# Patient Record
Sex: MALE | Race: WHITE | URBAN - NONMETROPOLITAN AREA
[De-identification: names, ages, dates, MRNs, and addresses within clinical notes are randomized per-mention and may not be internally consistent; named-entity substitution may affect disease eponyms.]

---

## 2024-08-29 ENCOUNTER — HOSPITAL ENCOUNTER (EMERGENCY)
Facility: HOSPITAL | Age: 64
End: 2024-08-29

## 2025-06-20 ENCOUNTER — HOSPITAL ENCOUNTER (INPATIENT)
Facility: HOSPITAL | Age: 65
End: 2025-06-20
Attending: DENTIST | Admitting: DENTIST
Payer: COMMERCIAL

## 2025-06-20 ENCOUNTER — APPOINTMENT (OUTPATIENT)
Dept: RADIOLOGY | Facility: HOSPITAL | Age: 65
DRG: 603 | End: 2025-06-20
Payer: COMMERCIAL

## 2025-06-20 ENCOUNTER — PREP FOR PROCEDURE (OUTPATIENT)
Facility: HOSPITAL | Age: 65
End: 2025-06-20

## 2025-06-20 ENCOUNTER — HOSPITAL ENCOUNTER (INPATIENT)
Facility: HOSPITAL | Age: 65
End: 2025-06-20
Attending: EMERGENCY MEDICINE | Admitting: INTERNAL MEDICINE
Payer: COMMERCIAL

## 2025-06-20 DIAGNOSIS — L02.11 CELLULITIS AND ABSCESS OF NECK: ICD-10-CM

## 2025-06-20 DIAGNOSIS — L02.11 CELLULITIS AND ABSCESS OF NECK: Primary | ICD-10-CM

## 2025-06-20 DIAGNOSIS — L02.01 SUBMENTAL ABSCESS: Primary | ICD-10-CM

## 2025-06-20 DIAGNOSIS — L03.221 CELLULITIS AND ABSCESS OF NECK: ICD-10-CM

## 2025-06-20 DIAGNOSIS — L03.221 CELLULITIS AND ABSCESS OF NECK: Primary | ICD-10-CM

## 2025-06-20 DIAGNOSIS — E11.65 TYPE 2 DIABETES MELLITUS WITH HYPERGLYCEMIA, WITHOUT LONG-TERM CURRENT USE OF INSULIN: ICD-10-CM

## 2025-06-20 PROBLEM — D72.829 LEUKOCYTOSIS: Status: ACTIVE | Noted: 2025-06-20

## 2025-06-20 PROBLEM — I10 HYPERTENSION: Status: ACTIVE | Noted: 2025-06-20

## 2025-06-20 PROBLEM — R73.9 HYPERGLYCEMIA: Status: ACTIVE | Noted: 2025-06-20

## 2025-06-20 LAB
ALBUMIN SERPL BCP-MCNC: 3.8 G/DL (ref 3.4–5)
ALP SERPL-CCNC: 76 U/L (ref 33–136)
ALT SERPL W P-5'-P-CCNC: 12 U/L (ref 10–52)
ANION GAP SERPL CALCULATED.3IONS-SCNC: 12 MMOL/L (ref 10–20)
AST SERPL W P-5'-P-CCNC: 9 U/L (ref 9–39)
BASOPHILS # BLD AUTO: 0.04 X10*3/UL (ref 0–0.1)
BASOPHILS NFR BLD AUTO: 0.3 %
BILIRUB SERPL-MCNC: 0.8 MG/DL (ref 0–1.2)
BUN SERPL-MCNC: 11 MG/DL (ref 6–23)
CALCIUM SERPL-MCNC: 9.2 MG/DL (ref 8.6–10.3)
CHLORIDE SERPL-SCNC: 95 MMOL/L (ref 98–107)
CO2 SERPL-SCNC: 30 MMOL/L (ref 21–32)
CREAT SERPL-MCNC: 0.73 MG/DL (ref 0.5–1.3)
EGFRCR SERPLBLD CKD-EPI 2021: >90 ML/MIN/1.73M*2
EOSINOPHIL # BLD AUTO: 0.08 X10*3/UL (ref 0–0.7)
EOSINOPHIL NFR BLD AUTO: 0.7 %
ERYTHROCYTE [DISTWIDTH] IN BLOOD BY AUTOMATED COUNT: 12.7 % (ref 11.5–14.5)
GLUCOSE SERPL-MCNC: 213 MG/DL (ref 74–99)
HCT VFR BLD AUTO: 42.7 % (ref 41–52)
HGB BLD-MCNC: 13.9 G/DL (ref 13.5–17.5)
IMM GRANULOCYTES # BLD AUTO: 0.11 X10*3/UL (ref 0–0.7)
IMM GRANULOCYTES NFR BLD AUTO: 0.9 % (ref 0–0.9)
LYMPHOCYTES # BLD AUTO: 1.68 X10*3/UL (ref 1.2–4.8)
LYMPHOCYTES NFR BLD AUTO: 14.4 %
MCH RBC QN AUTO: 27.3 PG (ref 26–34)
MCHC RBC AUTO-ENTMCNC: 32.6 G/DL (ref 32–36)
MCV RBC AUTO: 84 FL (ref 80–100)
MONOCYTES # BLD AUTO: 0.9 X10*3/UL (ref 0.1–1)
MONOCYTES NFR BLD AUTO: 7.7 %
NEUTROPHILS # BLD AUTO: 8.86 X10*3/UL (ref 1.2–7.7)
NEUTROPHILS NFR BLD AUTO: 76 %
NRBC BLD-RTO: 0 /100 WBCS (ref 0–0)
PLATELET # BLD AUTO: 305 X10*3/UL (ref 150–450)
POTASSIUM SERPL-SCNC: 3.9 MMOL/L (ref 3.5–5.3)
PROT SERPL-MCNC: 6.9 G/DL (ref 6.4–8.2)
RBC # BLD AUTO: 5.1 X10*6/UL (ref 4.5–5.9)
SODIUM SERPL-SCNC: 133 MMOL/L (ref 136–145)
WBC # BLD AUTO: 11.7 X10*3/UL (ref 4.4–11.3)

## 2025-06-20 PROCEDURE — 36415 COLL VENOUS BLD VENIPUNCTURE: CPT | Performed by: INTERNAL MEDICINE

## 2025-06-20 PROCEDURE — 87040 BLOOD CULTURE FOR BACTERIA: CPT | Mod: WESLAB | Performed by: INTERNAL MEDICINE

## 2025-06-20 PROCEDURE — 36415 COLL VENOUS BLD VENIPUNCTURE: CPT | Performed by: EMERGENCY MEDICINE

## 2025-06-20 PROCEDURE — 1210000001 HC SEMI-PRIVATE ROOM DAILY

## 2025-06-20 PROCEDURE — 99222 1ST HOSP IP/OBS MODERATE 55: CPT | Performed by: INTERNAL MEDICINE

## 2025-06-20 PROCEDURE — 85025 COMPLETE CBC W/AUTO DIFF WBC: CPT | Performed by: EMERGENCY MEDICINE

## 2025-06-20 PROCEDURE — 70492 CT SFT TSUE NCK W/O & W/DYE: CPT | Performed by: RADIOLOGY

## 2025-06-20 PROCEDURE — 2500000004 HC RX 250 GENERAL PHARMACY W/ HCPCS (ALT 636 FOR OP/ED): Performed by: EMERGENCY MEDICINE

## 2025-06-20 PROCEDURE — 99285 EMERGENCY DEPT VISIT HI MDM: CPT | Performed by: EMERGENCY MEDICINE

## 2025-06-20 PROCEDURE — 2500000004 HC RX 250 GENERAL PHARMACY W/ HCPCS (ALT 636 FOR OP/ED): Performed by: INTERNAL MEDICINE

## 2025-06-20 PROCEDURE — 2500000004 HC RX 250 GENERAL PHARMACY W/ HCPCS (ALT 636 FOR OP/ED): Mod: JZ | Performed by: DENTIST

## 2025-06-20 PROCEDURE — 70491 CT SOFT TISSUE NECK W/DYE: CPT

## 2025-06-20 PROCEDURE — 96366 THER/PROPH/DIAG IV INF ADDON: CPT

## 2025-06-20 PROCEDURE — 96365 THER/PROPH/DIAG IV INF INIT: CPT

## 2025-06-20 PROCEDURE — 70490 CT SOFT TISSUE NECK W/O DYE: CPT

## 2025-06-20 PROCEDURE — 2550000001 HC RX 255 CONTRASTS: Performed by: EMERGENCY MEDICINE

## 2025-06-20 PROCEDURE — 80053 COMPREHEN METABOLIC PANEL: CPT | Performed by: EMERGENCY MEDICINE

## 2025-06-20 RX ORDER — HYDROMORPHONE HYDROCHLORIDE 0.2 MG/ML
0.2 INJECTION INTRAMUSCULAR; INTRAVENOUS; SUBCUTANEOUS EVERY 4 HOURS PRN
Status: CANCELLED | OUTPATIENT
Start: 2025-06-20

## 2025-06-20 RX ORDER — ONDANSETRON HYDROCHLORIDE 2 MG/ML
4 INJECTION, SOLUTION INTRAVENOUS EVERY 8 HOURS PRN
Status: ACTIVE | OUTPATIENT
Start: 2025-06-20

## 2025-06-20 RX ORDER — ONDANSETRON 4 MG/1
4 TABLET, FILM COATED ORAL EVERY 8 HOURS PRN
Status: ACTIVE | OUTPATIENT
Start: 2025-06-20

## 2025-06-20 RX ORDER — IPRATROPIUM BROMIDE AND ALBUTEROL SULFATE 2.5; .5 MG/3ML; MG/3ML
3 SOLUTION RESPIRATORY (INHALATION) EVERY 6 HOURS PRN
Status: ACTIVE | OUTPATIENT
Start: 2025-06-20

## 2025-06-20 RX ORDER — ENOXAPARIN SODIUM 100 MG/ML
40 INJECTION SUBCUTANEOUS DAILY
Status: DISPENSED | OUTPATIENT
Start: 2025-06-21

## 2025-06-20 RX ORDER — SODIUM CHLORIDE 9 MG/ML
100 INJECTION, SOLUTION INTRAVENOUS CONTINUOUS
Status: DISCONTINUED | OUTPATIENT
Start: 2025-06-20 | End: 2025-06-20

## 2025-06-20 RX ORDER — AMOXICILLIN AND CLAVULANATE POTASSIUM 875; 125 MG/1; MG/1
875 TABLET, FILM COATED ORAL 2 TIMES DAILY
Status: ON HOLD | COMMUNITY

## 2025-06-20 RX ORDER — HYDRALAZINE HYDROCHLORIDE 20 MG/ML
5 INJECTION INTRAMUSCULAR; INTRAVENOUS EVERY 8 HOURS PRN
Status: ACTIVE | OUTPATIENT
Start: 2025-06-20

## 2025-06-20 RX ORDER — DEXTROSE 50 % IN WATER (D50W) INTRAVENOUS SYRINGE
12.5
Status: ACTIVE | OUTPATIENT
Start: 2025-06-20

## 2025-06-20 RX ORDER — SODIUM CHLORIDE 9 MG/ML
75 INJECTION, SOLUTION INTRAVENOUS CONTINUOUS
Status: CANCELLED | OUTPATIENT
Start: 2025-06-20 | End: 2025-06-21

## 2025-06-20 RX ORDER — DEXTROSE 50 % IN WATER (D50W) INTRAVENOUS SYRINGE
25
Status: ACTIVE | OUTPATIENT
Start: 2025-06-20

## 2025-06-20 RX ORDER — LISINOPRIL 20 MG/1
20 TABLET ORAL DAILY
Status: ON HOLD | COMMUNITY

## 2025-06-20 RX ORDER — MORPHINE SULFATE 2 MG/ML
2 INJECTION, SOLUTION INTRAMUSCULAR; INTRAVENOUS EVERY 2 HOUR PRN
Status: CANCELLED | OUTPATIENT
Start: 2025-06-20

## 2025-06-20 RX ORDER — PANTOPRAZOLE SODIUM 40 MG/10ML
40 INJECTION, POWDER, LYOPHILIZED, FOR SOLUTION INTRAVENOUS DAILY
Status: DISPENSED | OUTPATIENT
Start: 2025-06-20

## 2025-06-20 RX ORDER — MORPHINE SULFATE 2 MG/ML
2 INJECTION, SOLUTION INTRAMUSCULAR; INTRAVENOUS EVERY 2 HOUR PRN
Status: DISPENSED | OUTPATIENT
Start: 2025-06-20

## 2025-06-20 RX ORDER — NAPROXEN SODIUM 220 MG
440 TABLET ORAL EVERY 12 HOURS PRN
Status: ON HOLD | COMMUNITY

## 2025-06-20 RX ORDER — SODIUM CHLORIDE 9 MG/ML
75 INJECTION, SOLUTION INTRAVENOUS CONTINUOUS
Status: ACTIVE | OUTPATIENT
Start: 2025-06-20 | End: 2025-06-21

## 2025-06-20 RX ADMIN — AMPICILLIN SODIUM AND SULBACTAM SODIUM 3 G: 2; 1 INJECTION, POWDER, FOR SOLUTION INTRAMUSCULAR; INTRAVENOUS at 22:31

## 2025-06-20 RX ADMIN — PANTOPRAZOLE SODIUM 40 MG: 40 INJECTION, POWDER, LYOPHILIZED, FOR SOLUTION INTRAVENOUS at 22:43

## 2025-06-20 RX ADMIN — HYDROMORPHONE HYDROCHLORIDE 0.5 MG: 1 INJECTION, SOLUTION INTRAMUSCULAR; INTRAVENOUS; SUBCUTANEOUS at 22:04

## 2025-06-20 RX ADMIN — SODIUM CHLORIDE 75 ML/HR: 900 INJECTION, SOLUTION INTRAVENOUS at 16:11

## 2025-06-20 RX ADMIN — AMPICILLIN SODIUM AND SULBACTAM SODIUM 3 G: 2; 1 INJECTION, POWDER, FOR SOLUTION INTRAMUSCULAR; INTRAVENOUS at 16:11

## 2025-06-20 RX ADMIN — IOHEXOL 75 ML: 350 INJECTION, SOLUTION INTRAVENOUS at 18:23

## 2025-06-20 SDOH — HEALTH STABILITY: MENTAL HEALTH
DO YOU FEEL STRESS - TENSE, RESTLESS, NERVOUS, OR ANXIOUS, OR UNABLE TO SLEEP AT NIGHT BECAUSE YOUR MIND IS TROUBLED ALL THE TIME - THESE DAYS?: NOT AT ALL

## 2025-06-20 SDOH — ECONOMIC STABILITY: HOUSING INSECURITY: AT ANY TIME IN THE PAST 12 MONTHS, WERE YOU HOMELESS OR LIVING IN A SHELTER (INCLUDING NOW)?: NO

## 2025-06-20 SDOH — HEALTH STABILITY: MENTAL HEALTH: HOW OFTEN DO YOU HAVE A DRINK CONTAINING ALCOHOL?: MONTHLY OR LESS

## 2025-06-20 SDOH — SOCIAL STABILITY: SOCIAL INSECURITY: DOES ANYONE TRY TO KEEP YOU FROM HAVING/CONTACTING OTHER FRIENDS OR DOING THINGS OUTSIDE YOUR HOME?: NO

## 2025-06-20 SDOH — SOCIAL STABILITY: SOCIAL INSECURITY
WITHIN THE LAST YEAR, HAVE YOU BEEN KICKED, HIT, SLAPPED, OR OTHERWISE PHYSICALLY HURT BY YOUR PARTNER OR EX-PARTNER?: NO

## 2025-06-20 SDOH — HEALTH STABILITY: MENTAL HEALTH: HOW MANY DRINKS CONTAINING ALCOHOL DO YOU HAVE ON A TYPICAL DAY WHEN YOU ARE DRINKING?: 1 OR 2

## 2025-06-20 SDOH — HEALTH STABILITY: PHYSICAL HEALTH
HOW OFTEN DO YOU NEED TO HAVE SOMEONE HELP YOU WHEN YOU READ INSTRUCTIONS, PAMPHLETS, OR OTHER WRITTEN MATERIAL FROM YOUR DOCTOR OR PHARMACY?: RARELY

## 2025-06-20 SDOH — SOCIAL STABILITY: SOCIAL INSECURITY
WITHIN THE LAST YEAR, HAVE YOU BEEN RAPED OR FORCED TO HAVE ANY KIND OF SEXUAL ACTIVITY BY YOUR PARTNER OR EX-PARTNER?: NO

## 2025-06-20 SDOH — SOCIAL STABILITY: SOCIAL INSECURITY: HAVE YOU HAD ANY THOUGHTS OF HARMING ANYONE ELSE?: NO

## 2025-06-20 SDOH — SOCIAL STABILITY: SOCIAL INSECURITY: ARE YOU OR HAVE YOU BEEN THREATENED OR ABUSED PHYSICALLY, EMOTIONALLY, OR SEXUALLY BY ANYONE?: NO

## 2025-06-20 SDOH — ECONOMIC STABILITY: HOUSING INSECURITY: IN THE LAST 12 MONTHS, WAS THERE A TIME WHEN YOU WERE NOT ABLE TO PAY THE MORTGAGE OR RENT ON TIME?: NO

## 2025-06-20 SDOH — SOCIAL STABILITY: SOCIAL INSECURITY: WITHIN THE LAST YEAR, HAVE YOU BEEN HUMILIATED OR EMOTIONALLY ABUSED IN OTHER WAYS BY YOUR PARTNER OR EX-PARTNER?: NO

## 2025-06-20 SDOH — HEALTH STABILITY: PHYSICAL HEALTH: ON AVERAGE, HOW MANY MINUTES DO YOU ENGAGE IN EXERCISE AT THIS LEVEL?: 30 MIN

## 2025-06-20 SDOH — ECONOMIC STABILITY: FOOD INSECURITY: WITHIN THE PAST 12 MONTHS, YOU WORRIED THAT YOUR FOOD WOULD RUN OUT BEFORE YOU GOT THE MONEY TO BUY MORE.: NEVER TRUE

## 2025-06-20 SDOH — ECONOMIC STABILITY: INCOME INSECURITY: IN THE PAST 12 MONTHS HAS THE ELECTRIC, GAS, OIL, OR WATER COMPANY THREATENED TO SHUT OFF SERVICES IN YOUR HOME?: NO

## 2025-06-20 SDOH — SOCIAL STABILITY: SOCIAL NETWORK: HOW OFTEN DO YOU ATTEND CHURCH OR RELIGIOUS SERVICES?: MORE THAN 4 TIMES PER YEAR

## 2025-06-20 SDOH — HEALTH STABILITY: PHYSICAL HEALTH: ON AVERAGE, HOW MANY DAYS PER WEEK DO YOU ENGAGE IN MODERATE TO STRENUOUS EXERCISE (LIKE A BRISK WALK)?: 2 DAYS

## 2025-06-20 SDOH — HEALTH STABILITY: MENTAL HEALTH: HOW OFTEN DO YOU HAVE SIX OR MORE DRINKS ON ONE OCCASION?: NEVER

## 2025-06-20 SDOH — SOCIAL STABILITY: SOCIAL NETWORK
DO YOU BELONG TO ANY CLUBS OR ORGANIZATIONS SUCH AS CHURCH GROUPS, UNIONS, FRATERNAL OR ATHLETIC GROUPS, OR SCHOOL GROUPS?: NO

## 2025-06-20 SDOH — SOCIAL STABILITY: SOCIAL NETWORK: HOW OFTEN DO YOU ATTEND MEETINGS OF THE CLUBS OR ORGANIZATIONS YOU BELONG TO?: NEVER

## 2025-06-20 SDOH — SOCIAL STABILITY: SOCIAL INSECURITY: HAVE YOU HAD THOUGHTS OF HARMING ANYONE ELSE?: NO

## 2025-06-20 SDOH — SOCIAL STABILITY: SOCIAL INSECURITY: ARE YOU MARRIED, WIDOWED, DIVORCED, SEPARATED, NEVER MARRIED, OR LIVING WITH A PARTNER?: DIVORCED

## 2025-06-20 SDOH — ECONOMIC STABILITY: FOOD INSECURITY: WITHIN THE PAST 12 MONTHS, THE FOOD YOU BOUGHT JUST DIDN'T LAST AND YOU DIDN'T HAVE MONEY TO GET MORE.: NEVER TRUE

## 2025-06-20 SDOH — SOCIAL STABILITY: SOCIAL INSECURITY: WITHIN THE LAST YEAR, HAVE YOU BEEN AFRAID OF YOUR PARTNER OR EX-PARTNER?: NO

## 2025-06-20 SDOH — ECONOMIC STABILITY: TRANSPORTATION INSECURITY: IN THE PAST 12 MONTHS, HAS LACK OF TRANSPORTATION KEPT YOU FROM MEDICAL APPOINTMENTS OR FROM GETTING MEDICATIONS?: NO

## 2025-06-20 SDOH — SOCIAL STABILITY: SOCIAL NETWORK: HOW OFTEN DO YOU GET TOGETHER WITH FRIENDS OR RELATIVES?: MORE THAN THREE TIMES A WEEK

## 2025-06-20 SDOH — SOCIAL STABILITY: SOCIAL NETWORK
IN A TYPICAL WEEK, HOW MANY TIMES DO YOU TALK ON THE PHONE WITH FAMILY, FRIENDS, OR NEIGHBORS?: MORE THAN THREE TIMES A WEEK

## 2025-06-20 SDOH — ECONOMIC STABILITY: FOOD INSECURITY: HOW HARD IS IT FOR YOU TO PAY FOR THE VERY BASICS LIKE FOOD, HOUSING, MEDICAL CARE, AND HEATING?: NOT HARD AT ALL

## 2025-06-20 SDOH — ECONOMIC STABILITY: HOUSING INSECURITY: IN THE PAST 12 MONTHS, HOW MANY TIMES HAVE YOU MOVED WHERE YOU WERE LIVING?: 1

## 2025-06-20 SDOH — SOCIAL STABILITY: SOCIAL INSECURITY: ABUSE: ADULT

## 2025-06-20 SDOH — SOCIAL STABILITY: SOCIAL INSECURITY: HAS ANYONE EVER THREATENED TO HURT YOUR FAMILY OR YOUR PETS?: NO

## 2025-06-20 SDOH — SOCIAL STABILITY: SOCIAL INSECURITY: ARE THERE ANY APPARENT SIGNS OF INJURIES/BEHAVIORS THAT COULD BE RELATED TO ABUSE/NEGLECT?: NO

## 2025-06-20 SDOH — SOCIAL STABILITY: SOCIAL INSECURITY: WERE YOU ABLE TO COMPLETE ALL THE BEHAVIORAL HEALTH SCREENINGS?: YES

## 2025-06-20 SDOH — HEALTH STABILITY: MENTAL HEALTH: EXPERIENCED ANY OF THE FOLLOWING LIFE EVENTS: OTHER (COMMENT)

## 2025-06-20 SDOH — SOCIAL STABILITY: SOCIAL INSECURITY: DO YOU FEEL ANYONE HAS EXPLOITED OR TAKEN ADVANTAGE OF YOU FINANCIALLY OR OF YOUR PERSONAL PROPERTY?: NO

## 2025-06-20 SDOH — SOCIAL STABILITY: SOCIAL INSECURITY: POSSIBLE ABUSE REPORTED TO:: OTHER (COMMENT)

## 2025-06-20 SDOH — SOCIAL STABILITY: SOCIAL INSECURITY: DO YOU FEEL UNSAFE GOING BACK TO THE PLACE WHERE YOU ARE LIVING?: NO

## 2025-06-20 ASSESSMENT — COGNITIVE AND FUNCTIONAL STATUS - GENERAL
DAILY ACTIVITIY SCORE: 24
PATIENT BASELINE BEDBOUND: NO
MOBILITY SCORE: 24

## 2025-06-20 ASSESSMENT — ACTIVITIES OF DAILY LIVING (ADL)
HEARING - RIGHT EAR: FUNCTIONAL
TOILETING: INDEPENDENT
GROOMING: INDEPENDENT
JUDGMENT_ADEQUATE_SAFELY_COMPLETE_DAILY_ACTIVITIES: YES
LACK_OF_TRANSPORTATION: NO
DRESSING YOURSELF: INDEPENDENT
BATHING: INDEPENDENT
FEEDING YOURSELF: INDEPENDENT
HEARING - LEFT EAR: FUNCTIONAL
WALKS IN HOME: INDEPENDENT
ADEQUATE_TO_COMPLETE_ADL: YES
PATIENT'S MEMORY ADEQUATE TO SAFELY COMPLETE DAILY ACTIVITIES?: YES

## 2025-06-20 ASSESSMENT — LIFESTYLE VARIABLES
HOW OFTEN DO YOU HAVE 6 OR MORE DRINKS ON ONE OCCASION: NEVER
HOW MANY STANDARD DRINKS CONTAINING ALCOHOL DO YOU HAVE ON A TYPICAL DAY: 1 OR 2
AUDIT-C TOTAL SCORE: 1
AUDIT-C TOTAL SCORE: 1
PRESCIPTION_ABUSE_PAST_12_MONTHS: NO
SUBSTANCE_ABUSE_PAST_12_MONTHS: NO
SKIP TO QUESTIONS 9-10: 1
AUDIT-C TOTAL SCORE: 1
HOW OFTEN DO YOU HAVE A DRINK CONTAINING ALCOHOL: MONTHLY OR LESS
SKIP TO QUESTIONS 9-10: 1

## 2025-06-20 ASSESSMENT — PAIN - FUNCTIONAL ASSESSMENT
PAIN_FUNCTIONAL_ASSESSMENT: 0-10

## 2025-06-20 ASSESSMENT — PAIN DESCRIPTION - DESCRIPTORS: DESCRIPTORS: BURNING

## 2025-06-20 ASSESSMENT — PAIN SCALES - GENERAL
PAINLEVEL_OUTOF10: 1
PAINLEVEL_OUTOF10: 7
PAINLEVEL_OUTOF10: 8

## 2025-06-20 ASSESSMENT — PATIENT HEALTH QUESTIONNAIRE - PHQ9
SUM OF ALL RESPONSES TO PHQ9 QUESTIONS 1 & 2: 0
1. LITTLE INTEREST OR PLEASURE IN DOING THINGS: NOT AT ALL
2. FEELING DOWN, DEPRESSED OR HOPELESS: NOT AT ALL

## 2025-06-20 ASSESSMENT — PAIN DESCRIPTION - LOCATION
LOCATION: NECK
LOCATION: JAW

## 2025-06-20 ASSESSMENT — PAIN DESCRIPTION - ORIENTATION: ORIENTATION: LOWER;MID

## 2025-06-20 NOTE — ED PROVIDER NOTES
HPI   Chief Complaint   Patient presents with    neck swelling     Pt arrived from home with neck swelling. Pt states he had a tooth infection x 2 weeks, Pt having difficulty swallowing salivia.        HPI  This is a 64-year-old male presenting to the emergency department for evaluation of neck swelling.  [place] [facility] Kwong who plans to do incision and drainage tomorrow.  Was post to be a direct event however a bed is not available for him at this time and in the lobby patient was reportedly having difficulty swallowing so he was brought back.  Upon initial presentation, patient is not having any difficulty in the room providing a history, no difficulty talking.  He states the pain in his neck has been ongoing for last 2 weeks.  He has been on antibiotics prescribed by his dentist.  He has had subjective fever and chills.    Please see HPI for pertinent positive and negative ROS.       Patient History   Medical History[1]  Surgical History[2]  Family History[3]  Social History[4]    Physical Exam   ED Triage Vitals [06/20/25 1554]   Temperature Heart Rate Respirations BP   36.5 °C (97.7 °F) 95 16 154/85      Pulse Ox Temp src Heart Rate Source Patient Position   100 % -- -- Sitting      BP Location FiO2 (%)     Left arm --       Physical Exam  GENERAL APPEARANCE: Awake and alert. No acute respiratory distress.  Patient is talking in smooth nondyspneic sentences.  No stridor.  He is handling his oral secretions.  VITAL SIGNS: As per the nurses' triage record.  HEENT: Normocephalic, atraumatic. Extraocular muscles are intact. Conjunctiva are pink. Negative scleral icterus. Mucous membranes are moist. Tongue in the midline. Oropharynx clear, uvula midline.  TMs intact without erythema, no mastoid bone erythema or edema bilaterally.  NECK: Soft, and supple, full gross ROM, no meningeal signs.Induration and swelling to the submandibular region, area is indurated no fluctuance to  palpation.  CHEST: Nontender to palpation. Clear to auscultation bilaterally.  Symmetric rise and fall of chest wall.   HEART: Clear S1 and S2. Regular rate and rhythm. Strong and equal pulses in the extremities.  ABDOMEN: Soft, nondistended  MUSCULOSKELETAL: Full gross active range of motion. Ambulating on own with no acute difficulties  NEUROLOGICAL: Awake, alert and oriented x 3. Motor power intact in the upper and lower extremities. Sensation is intact to light touch in the upper and lower extremities. Patient answering questions appropriately.   IMMUNOLOGICAL: No lymphatic streaking noted  DERMATOLOGIC: Warm and dry   PYSCH: Cooperative with appropriate mood and affect.    ED Course & MDM   Diagnoses as of 06/20/25 2134   Submental abscess                 No data recorded     Manning Coma Scale Score: 15 (06/20/25 1600 : Soraida Brandon RN)                           Medical Decision Making  Parts of this chart have been completed using voice recognition software. Please excuse any errors of transcription.  My thought process and reason for plan has been formulated from the time that I saw the patient until the time of disposition and is not specific to one specific moment during their visit and furthermore my MDM encompasses this entire chart and not only this text box.      HPI: Detailed above.    Exam: A medically appropriate exam performed, outlined above, given the known history and presentation.    History obtained from: Patient    Medications given during visit:  Medications   sodium chloride 0.9% infusion (75 mL/hr intravenous New Bag 6/20/25 1611)   ampicillin-sulbactam (Unasyn) 3 g in sodium chloride 0.9%  mL (has no administration in time range)   HYDROmorphone (Dilaudid) injection 0.5 mg (has no administration in time range)   HYDROmorphone (Dilaudid) injection 0.2 mg (has no administration in time range)   morphine injection 2 mg (has no administration in time range)   pantoprazole (Protonix)  injection 40 mg (has no administration in time range)   glucagon (Glucagen) injection 1 mg (has no administration in time range)   dextrose 50 % injection 25 g (has no administration in time range)   glucagon (Glucagen) injection 1 mg (has no administration in time range)   dextrose 50 % injection 12.5 g (has no administration in time range)   hydrALAZINE (Apresoline) injection 5 mg (has no administration in time range)   ipratropium-albuteroL (Duo-Neb) 0.5-2.5 mg/3 mL nebulizer solution 3 mL (has no administration in time range)   enoxaparin (Lovenox) syringe 40 mg (has no administration in time range)   ondansetron (Zofran) tablet 4 mg (has no administration in time range)     Or   ondansetron (Zofran) injection 4 mg (has no administration in time range)   ampicillin-sulbactam (Unasyn) 3 g in sodium chloride 0.9%  mL (0 g intravenous Stopped 6/20/25 1908)   iohexol (OMNIPaque) 350 mg iodine/mL solution 75 mL (75 mL intravenous Given 6/20/25 1823)        Diagnostic/tests  Labs Reviewed   CBC WITH AUTO DIFFERENTIAL - Abnormal       Result Value    WBC 11.7 (*)     nRBC 0.0      RBC 5.10      Hemoglobin 13.9      Hematocrit 42.7      MCV 84      MCH 27.3      MCHC 32.6      RDW 12.7      Platelets 305      Neutrophils % 76.0      Immature Granulocytes %, Automated 0.9      Lymphocytes % 14.4      Monocytes % 7.7      Eosinophils % 0.7      Basophils % 0.3      Neutrophils Absolute 8.86 (*)     Immature Granulocytes Absolute, Automated 0.11      Lymphocytes Absolute 1.68      Monocytes Absolute 0.90      Eosinophils Absolute 0.08      Basophils Absolute 0.04     COMPREHENSIVE METABOLIC PANEL - Abnormal    Glucose 213 (*)     Sodium 133 (*)     Potassium 3.9      Chloride 95 (*)     Bicarbonate 30      Anion Gap 12      Urea Nitrogen 11      Creatinine 0.73      eGFR >90      Calcium 9.2      Albumin 3.8      Alkaline Phosphatase 76      Total Protein 6.9      AST 9      Bilirubin, Total 0.8      ALT 12     BLOOD  CULTURE   CBC WITH AUTO DIFFERENTIAL   COMPREHENSIVE METABOLIC PANEL   PROTIME-INR   TYPE AND SCREEN      CT soft tissue neck w IV contrast   Final Result   Large submental abscess. This measures up to 4.6 cm. There appears to   be a phlegmonous tract tracking along the right mylohyoid muscle and   extending from the right mandibular molar extraction cavity to the   aforementioned collection.        MACRO:   None        Signed by: Lisandro Leon 6/20/2025 6:57 PM   Dictation workstation:   [id number]      CT soft tissue neck wo IV contrast   Final Result   Ill-defined soft tissue density material in the submental region,   suspected infectious process. Difficult to determine if abscess is   present without IV contrast.        Extraction cavity right mandibular 2nd molar. There is erosion of the   subjacent inferomedial mandibular cortex. There is suspected tracking   of inflammation along the right mylohyoid muscle into the submental   region and communication with the aforementioned infectious process.        Otherwise multifocal endodontal and periodontal disease as described.        MACRO:   None        Signed by: Lisandro Leon 6/20/2025 4:56 PM   Dictation workstation:   [id number]           Considerations/further MDM:  Patient was seen in conjucntion with my supervising physician,  Dr. Del Valle. Please refer to her note.    CT soft tissue neck with IV contrast shows large submental abscess.  CBC shows mild leukocytosis with a white blood cell count of 11.7.  CMP unremarkable.  Patient was given IV Unasyn and IV fluids.  Admitted in stable condition to accepting physician, Dr. Wood.  Patient was agreeable to plan.    Procedure  Procedures       [1] No past medical history on file.  [2] No past surgical history on file.  [3] No family history on file.  [4]   Social History  Tobacco Use    Smoking status: Not on file    Smokeless tobacco: Not on file   Substance Use Topics    Alcohol use: Not on file    Drug  use: Not on file        Melanie Orona PA-C  06/20/25 3450

## 2025-06-20 NOTE — PROGRESS NOTES
Pharmacy Medication History Review    Adam Contreras is a 64 y.o. male. Pharmacy reviewed the patient's [id number] medications and allergies for accuracy.    Medications ADDED:  Lisinopril 20mg  Augmentin 875-125mf  Aleve 220  Medications CHANGED:  none  Medications REMOVED:   None      The list below reflects the updated PTA list. Comments regarding how patient may be taking medications differently can be found in the Admit Orders Activity  Prior to Admission Medications   Prescriptions Last Dose Informant   amoxicillin-clavulanate (Augmentin) 875-125 mg tablet 6/20/2025 Self   Sig: Take 1 tablet (875 mg of amoxicillin) by mouth 2 times a day.   lisinopril 20 mg tablet 6/20/2025 Self   Sig: Take 1 tablet (20 mg) by mouth once daily.   naproxen sodium (Aleve) 220 mg tablet Past Week Self   Sig: Take 2 tablets (440 mg) by mouth every 12 hours if needed for mild pain (1 - 3).      Facility-Administered Medications: None        The list below reflects the updated allergy list. Please review each documented allergy for additional clarification and justification.  Allergies  Reviewed by Pretty Guillaume CPhT on 6/20/2025   No Known Allergies         Pharmacy has been updated to [facility].    Sources used to complete the med history include dispense history, PTA medication list, patient interview. Patient is a good historian.    Below are additional concerns with the patient's PTA list.  None     Pretty Guillaume CPhT-Adv  Please reach out via [employer] Secure Chat for questions

## 2025-06-21 ENCOUNTER — PREP FOR PROCEDURE (OUTPATIENT)
Facility: HOSPITAL | Age: 65
End: 2025-06-21

## 2025-06-21 ENCOUNTER — ANESTHESIA EVENT (OUTPATIENT)
Dept: OPERATING ROOM | Facility: HOSPITAL | Age: 65
End: 2025-06-21
Payer: COMMERCIAL

## 2025-06-21 ENCOUNTER — ANESTHESIA (OUTPATIENT)
Dept: OPERATING ROOM | Facility: HOSPITAL | Age: 65
End: 2025-06-21
Payer: COMMERCIAL

## 2025-06-21 PROBLEM — E78.5 HYPERLIPIDEMIA: Status: ACTIVE | Noted: 2025-06-21

## 2025-06-21 PROBLEM — M15.9 OSTEOARTHRITIS OF MULTIPLE JOINTS: Status: ACTIVE | Noted: 2025-06-21

## 2025-06-21 PROBLEM — L02.11 CELLULITIS AND ABSCESS OF NECK: Status: ACTIVE | Noted: 2025-06-20

## 2025-06-21 PROBLEM — U07.0 VAPING-RELATED DISORDER: Status: ACTIVE | Noted: 2025-06-21

## 2025-06-21 PROBLEM — L03.221 CELLULITIS AND ABSCESS OF NECK: Status: ACTIVE | Noted: 2025-06-20

## 2025-06-21 LAB
ABO GROUP (TYPE) IN BLOOD: NORMAL
ALBUMIN SERPL BCP-MCNC: 3.4 G/DL (ref 3.4–5)
ALP SERPL-CCNC: 68 U/L (ref 33–136)
ALT SERPL W P-5'-P-CCNC: 10 U/L (ref 10–52)
ANION GAP SERPL CALCULATED.3IONS-SCNC: 11 MMOL/L (ref 10–20)
ANTIBODY SCREEN: NORMAL
AST SERPL W P-5'-P-CCNC: 9 U/L (ref 9–39)
BASOPHILS # BLD AUTO: 0.03 X10*3/UL (ref 0–0.1)
BASOPHILS NFR BLD AUTO: 0.3 %
BILIRUB SERPL-MCNC: 0.7 MG/DL (ref 0–1.2)
BUN SERPL-MCNC: 10 MG/DL (ref 6–23)
BURR CELLS BLD QL SMEAR: NORMAL
CALCIUM SERPL-MCNC: 8.7 MG/DL (ref 8.6–10.3)
CHLORIDE SERPL-SCNC: 99 MMOL/L (ref 98–107)
CO2 SERPL-SCNC: 28 MMOL/L (ref 21–32)
CREAT SERPL-MCNC: 0.62 MG/DL (ref 0.5–1.3)
EGFRCR SERPLBLD CKD-EPI 2021: >90 ML/MIN/1.73M*2
EOSINOPHIL # BLD AUTO: 0.07 X10*3/UL (ref 0–0.7)
EOSINOPHIL NFR BLD AUTO: 0.7 %
ERYTHROCYTE [DISTWIDTH] IN BLOOD BY AUTOMATED COUNT: 12.8 % (ref 11.5–14.5)
GLUCOSE SERPL-MCNC: 203 MG/DL (ref 74–99)
HCT VFR BLD AUTO: 41.9 % (ref 41–52)
HGB BLD-MCNC: 13.8 G/DL (ref 13.5–17.5)
IMM GRANULOCYTES # BLD AUTO: 0.08 X10*3/UL (ref 0–0.7)
IMM GRANULOCYTES NFR BLD AUTO: 0.8 % (ref 0–0.9)
INR PPP: 1.1 (ref 0.9–1.2)
LYMPHOCYTES # BLD AUTO: 1.55 X10*3/UL (ref 1.2–4.8)
LYMPHOCYTES NFR BLD AUTO: 14.7 %
MCH RBC QN AUTO: 27.2 PG (ref 26–34)
MCHC RBC AUTO-ENTMCNC: 32.9 G/DL (ref 32–36)
MCV RBC AUTO: 83 FL (ref 80–100)
MONOCYTES # BLD AUTO: 0.97 X10*3/UL (ref 0.1–1)
MONOCYTES NFR BLD AUTO: 9.2 %
NEUTROPHILS # BLD AUTO: 7.87 X10*3/UL (ref 1.2–7.7)
NEUTROPHILS NFR BLD AUTO: 74.3 %
NRBC BLD-RTO: 0 /100 WBCS (ref 0–0)
PLATELET # BLD AUTO: 247 X10*3/UL (ref 150–450)
POLYCHROMASIA BLD QL SMEAR: NORMAL
POTASSIUM SERPL-SCNC: 3.9 MMOL/L (ref 3.5–5.3)
PROT SERPL-MCNC: 6.2 G/DL (ref 6.4–8.2)
PROTHROMBIN TIME: 11.7 SECONDS (ref 9.3–12.7)
RBC # BLD AUTO: 5.07 X10*6/UL (ref 4.5–5.9)
RBC MORPH BLD: NORMAL
RH FACTOR (ANTIGEN D): NORMAL
SODIUM SERPL-SCNC: 134 MMOL/L (ref 136–145)
WBC # BLD AUTO: 10.6 X10*3/UL (ref 4.4–11.3)

## 2025-06-21 PROCEDURE — 2500000004 HC RX 250 GENERAL PHARMACY W/ HCPCS (ALT 636 FOR OP/ED): Performed by: INTERNAL MEDICINE

## 2025-06-21 PROCEDURE — 3700000001 HC GENERAL ANESTHESIA TIME - INITIAL BASE CHARGE: Performed by: DENTIST

## 2025-06-21 PROCEDURE — 0W960ZZ DRAINAGE OF NECK, OPEN APPROACH: ICD-10-PCS | Performed by: DENTIST

## 2025-06-21 PROCEDURE — 7100000002 HC RECOVERY ROOM TIME - EACH INCREMENTAL 1 MINUTE: Performed by: DENTIST

## 2025-06-21 PROCEDURE — A4649 SURGICAL SUPPLIES: HCPCS | Performed by: DENTIST

## 2025-06-21 PROCEDURE — 80053 COMPREHEN METABOLIC PANEL: CPT | Performed by: INTERNAL MEDICINE

## 2025-06-21 PROCEDURE — 86901 BLOOD TYPING SEROLOGIC RH(D): CPT | Performed by: INTERNAL MEDICINE

## 2025-06-21 PROCEDURE — 99232 SBSQ HOSP IP/OBS MODERATE 35: CPT | Performed by: INTERNAL MEDICINE

## 2025-06-21 PROCEDURE — 86850 RBC ANTIBODY SCREEN: CPT | Performed by: INTERNAL MEDICINE

## 2025-06-21 PROCEDURE — 87070 CULTURE OTHR SPECIMN AEROBIC: CPT | Mod: WESLAB | Performed by: DENTIST

## 2025-06-21 PROCEDURE — 85610 PROTHROMBIN TIME: CPT | Performed by: INTERNAL MEDICINE

## 2025-06-21 PROCEDURE — 87075 CULTR BACTERIA EXCEPT BLOOD: CPT | Mod: WESLAB | Performed by: DENTIST

## 2025-06-21 PROCEDURE — 2500000004 HC RX 250 GENERAL PHARMACY W/ HCPCS (ALT 636 FOR OP/ED): Performed by: ANESTHESIOLOGIST ASSISTANT

## 2025-06-21 PROCEDURE — 2720000007 HC OR 272 NO HCPCS: Performed by: DENTIST

## 2025-06-21 PROCEDURE — 3600000002 HC OR TIME - INITIAL BASE CHARGE - PROCEDURE LEVEL TWO: Performed by: DENTIST

## 2025-06-21 PROCEDURE — 7100000001 HC RECOVERY ROOM TIME - INITIAL BASE CHARGE: Performed by: DENTIST

## 2025-06-21 PROCEDURE — 2500000004 HC RX 250 GENERAL PHARMACY W/ HCPCS (ALT 636 FOR OP/ED)

## 2025-06-21 PROCEDURE — 1210000001 HC SEMI-PRIVATE ROOM DAILY

## 2025-06-21 PROCEDURE — 36415 COLL VENOUS BLD VENIPUNCTURE: CPT | Performed by: INTERNAL MEDICINE

## 2025-06-21 PROCEDURE — 87206 SMEAR FLUORESCENT/ACID STAI: CPT | Mod: WESLAB | Performed by: DENTIST

## 2025-06-21 PROCEDURE — 3700000002 HC GENERAL ANESTHESIA TIME - EACH INCREMENTAL 1 MINUTE: Performed by: DENTIST

## 2025-06-21 PROCEDURE — 87102 FUNGUS ISOLATION CULTURE: CPT | Mod: WESLAB | Performed by: DENTIST

## 2025-06-21 PROCEDURE — 85025 COMPLETE CBC W/AUTO DIFF WBC: CPT | Performed by: INTERNAL MEDICINE

## 2025-06-21 PROCEDURE — 2500000004 HC RX 250 GENERAL PHARMACY W/ HCPCS (ALT 636 FOR OP/ED): Performed by: DENTIST

## 2025-06-21 PROCEDURE — 3600000007 HC OR TIME - EACH INCREMENTAL 1 MINUTE - PROCEDURE LEVEL TWO: Performed by: DENTIST

## 2025-06-21 PROCEDURE — 2500000004 HC RX 250 GENERAL PHARMACY W/ HCPCS (ALT 636 FOR OP/ED): Mod: JZ

## 2025-06-21 RX ORDER — PROPOFOL 10 MG/ML
INJECTION, EMULSION INTRAVENOUS AS NEEDED
Status: DISCONTINUED | OUTPATIENT
Start: 2025-06-21 | End: 2025-06-21

## 2025-06-21 RX ORDER — HYDROMORPHONE HYDROCHLORIDE 0.2 MG/ML
0.1 INJECTION INTRAMUSCULAR; INTRAVENOUS; SUBCUTANEOUS EVERY 5 MIN PRN
OUTPATIENT
Start: 2025-06-21

## 2025-06-21 RX ORDER — FENTANYL CITRATE 50 UG/ML
INJECTION, SOLUTION INTRAMUSCULAR; INTRAVENOUS AS NEEDED
Status: DISCONTINUED | OUTPATIENT
Start: 2025-06-21 | End: 2025-06-21

## 2025-06-21 RX ORDER — ONDANSETRON HYDROCHLORIDE 2 MG/ML
INJECTION, SOLUTION INTRAVENOUS AS NEEDED
Status: DISCONTINUED | OUTPATIENT
Start: 2025-06-21 | End: 2025-06-21

## 2025-06-21 RX ORDER — LIDOCAINE HYDROCHLORIDE 20 MG/ML
INJECTION, SOLUTION INFILTRATION; PERINEURAL AS NEEDED
Status: DISCONTINUED | OUTPATIENT
Start: 2025-06-21 | End: 2025-06-21

## 2025-06-21 RX ORDER — SODIUM CHLORIDE, SODIUM LACTATE, POTASSIUM CHLORIDE, CALCIUM CHLORIDE 600; 310; 30; 20 MG/100ML; MG/100ML; MG/100ML; MG/100ML
100 INJECTION, SOLUTION INTRAVENOUS CONTINUOUS
OUTPATIENT
Start: 2025-06-21 | End: 2025-06-22

## 2025-06-21 RX ORDER — HYDROMORPHONE HYDROCHLORIDE 0.2 MG/ML
0.2 INJECTION INTRAMUSCULAR; INTRAVENOUS; SUBCUTANEOUS EVERY 5 MIN PRN
OUTPATIENT
Start: 2025-06-21

## 2025-06-21 RX ORDER — ONDANSETRON HYDROCHLORIDE 2 MG/ML
4 INJECTION, SOLUTION INTRAVENOUS ONCE AS NEEDED
OUTPATIENT
Start: 2025-06-21

## 2025-06-21 RX ORDER — LIDOCAINE HYDROCHLORIDE 10 MG/ML
0.1 INJECTION, SOLUTION INFILTRATION; PERINEURAL ONCE
OUTPATIENT
Start: 2025-06-21 | End: 2025-06-21

## 2025-06-21 RX ORDER — ROCURONIUM BROMIDE 10 MG/ML
INJECTION, SOLUTION INTRAVENOUS AS NEEDED
Status: DISCONTINUED | OUTPATIENT
Start: 2025-06-21 | End: 2025-06-21

## 2025-06-21 RX ORDER — ALBUTEROL SULFATE 0.83 MG/ML
2.5 SOLUTION RESPIRATORY (INHALATION) ONCE AS NEEDED
OUTPATIENT
Start: 2025-06-21

## 2025-06-21 RX ORDER — HYDRALAZINE HYDROCHLORIDE 20 MG/ML
5 INJECTION INTRAMUSCULAR; INTRAVENOUS EVERY 30 MIN PRN
OUTPATIENT
Start: 2025-06-21

## 2025-06-21 RX ADMIN — AMPICILLIN SODIUM AND SULBACTAM SODIUM 3 G: 2; 1 INJECTION, POWDER, FOR SOLUTION INTRAMUSCULAR; INTRAVENOUS at 09:36

## 2025-06-21 RX ADMIN — AMPICILLIN SODIUM AND SULBACTAM SODIUM 3 G: 2; 1 INJECTION, POWDER, FOR SOLUTION INTRAMUSCULAR; INTRAVENOUS at 03:51

## 2025-06-21 RX ADMIN — ONDANSETRON HYDROCHLORIDE 4 MG: 2 INJECTION INTRAMUSCULAR; INTRAVENOUS at 16:17

## 2025-06-21 RX ADMIN — SODIUM CHLORIDE, POTASSIUM CHLORIDE, SODIUM LACTATE AND CALCIUM CHLORIDE: 600; 310; 30; 20 INJECTION, SOLUTION INTRAVENOUS at 16:00

## 2025-06-21 RX ADMIN — HYDROMORPHONE HYDROCHLORIDE 0.5 MG: 1 INJECTION, SOLUTION INTRAMUSCULAR; INTRAVENOUS; SUBCUTANEOUS at 17:48

## 2025-06-21 RX ADMIN — ENOXAPARIN SODIUM 40 MG: 40 INJECTION SUBCUTANEOUS at 17:48

## 2025-06-21 RX ADMIN — HYDROMORPHONE HYDROCHLORIDE 0.5 MG: 1 INJECTION, SOLUTION INTRAMUSCULAR; INTRAVENOUS; SUBCUTANEOUS at 08:54

## 2025-06-21 RX ADMIN — FENTANYL CITRATE 50 MCG: 0.05 INJECTION, SOLUTION INTRAMUSCULAR; INTRAVENOUS at 16:04

## 2025-06-21 RX ADMIN — AMPICILLIN SODIUM AND SULBACTAM SODIUM 3 G: 2; 1 INJECTION, POWDER, FOR SOLUTION INTRAMUSCULAR; INTRAVENOUS at 16:06

## 2025-06-21 RX ADMIN — AMPICILLIN SODIUM AND SULBACTAM SODIUM 3 G: 2; 1 INJECTION, POWDER, FOR SOLUTION INTRAMUSCULAR; INTRAVENOUS at 21:25

## 2025-06-21 RX ADMIN — PROPOFOL 200 MG: 10 INJECTION, EMULSION INTRAVENOUS at 16:04

## 2025-06-21 RX ADMIN — MORPHINE SULFATE 2 MG: 2 INJECTION, SOLUTION INTRAMUSCULAR; INTRAVENOUS at 19:07

## 2025-06-21 RX ADMIN — ROCURONIUM BROMIDE 35 MG: 10 INJECTION, SOLUTION INTRAVENOUS at 16:04

## 2025-06-21 RX ADMIN — DEXAMETHASONE SODIUM PHOSPHATE 8 MG: 4 INJECTION, SOLUTION INTRAMUSCULAR; INTRAVENOUS at 16:13

## 2025-06-21 RX ADMIN — PANTOPRAZOLE SODIUM 40 MG: 40 INJECTION, POWDER, LYOPHILIZED, FOR SOLUTION INTRAVENOUS at 08:49

## 2025-06-21 RX ADMIN — LIDOCAINE HYDROCHLORIDE 50 MG: 20 INJECTION, SOLUTION INFILTRATION; PERINEURAL at 16:04

## 2025-06-21 SDOH — HEALTH STABILITY: MENTAL HEALTH: CURRENT SMOKER: 0

## 2025-06-21 ASSESSMENT — PAIN DESCRIPTION - LOCATION
LOCATION: NECK
LOCATION: NECK
LOCATION: MOUTH

## 2025-06-21 ASSESSMENT — PAIN SCALES - GENERAL
PAINLEVEL_OUTOF10: 0 - NO PAIN
PAINLEVEL_OUTOF10: 8
PAINLEVEL_OUTOF10: 3
PAINLEVEL_OUTOF10: 1
PAINLEVEL_OUTOF10: 9
PAINLEVEL_OUTOF10: 5 - MODERATE PAIN
PAINLEVEL_OUTOF10: 1
PAINLEVEL_OUTOF10: 7
PAINLEVEL_OUTOF10: 0 - NO PAIN
PAINLEVEL_OUTOF10: 3

## 2025-06-21 ASSESSMENT — PAIN DESCRIPTION - ORIENTATION
ORIENTATION: MID

## 2025-06-21 ASSESSMENT — PAIN DESCRIPTION - DESCRIPTORS
DESCRIPTORS: SORE
DESCRIPTORS: SHARP;SORE
DESCRIPTORS: ACHING
DESCRIPTORS: SORE;SHARP

## 2025-06-21 ASSESSMENT — PAIN - FUNCTIONAL ASSESSMENT
PAIN_FUNCTIONAL_ASSESSMENT: 0-10

## 2025-06-21 ASSESSMENT — COGNITIVE AND FUNCTIONAL STATUS - GENERAL
DAILY ACTIVITIY SCORE: 24
MOBILITY SCORE: 24

## 2025-06-21 NOTE — ASSESSMENT & PLAN NOTE
Continue to monitor BP and adjust antihypertensive medications accordingly  IV hydralazine as needed as the patient is currently strict n.p.o.

## 2025-06-21 NOTE — CONSULTS
Reason For Consult  Neck abscess    History Of Present Illness  Adam Contreras is a 64 y.o. male who presented to my outpatient clinic on 6/20/25 with worsening pain and cellulitis of the anterior neck.  Patient was originally seen 1 week prior after having tooth #31 removed by his general dentist with progressive pain and swelling at that time.  Antibiotic was switched to broader spectrum and told to follow up if condition worsened.  After re-eval and discussing best course of treatment with patient, we planned on admit to [facility] [place], operated by [facility] for CT scan, IV antibiotics, and surgical drainage.      Plan is I&D on Saturday after workup completed.       Past Medical History   CRP elevated 11/29/11    Elevated cholesterol 2/4/15    Elevated fasting glucose 2010    Essential hypertension, benign      Neck pain, chronic       from work injury    Tubular adenoma      Vitamin D insufficiency        Surgical History   KNEE ARTHROSCOP MENISCUS REPAIR MED/LAT   2/2011     left knee         Social History  Tobacco Use    Smoking status: Never    Smokeless tobacco: Never    Tobacco comments:       very rarely   Vaping Use    Vaping status: Never Used   Substance Use Topics    Alcohol use: Yes       Alcohol/week: 2.0 standard drinks of alcohol       Types: 2 Cans of Beer (12oz) per week    Drug use: No       Family History  Problem Relation Age of Onset    None Mother      Heart Father      None Sister      None Brother      None Brother      None Brother              Allergies  Patient has no known allergies.    Review of Systems  14-point ROS otherwise negative, as per HPI/Interval History.     General: No change in weight. No weakenss. No Fevers/Chills/Night Sweats   Skin: No skin/hair/nail changes. No rashes or sores.  Head:  No trauma. No Headache/nasuea/vomitting.   Eyes: No visual changes. No tearing. No itching.   Ears: No hearing loss. No tinnitus. No vertigo. No discharge.  Nose, Sinuses: No rhinorrhea, No nasal congestion. No  "epistaxis.  Mouth, Throat, Neck: No bleeding gums, hoarseness, sore throat or swollen neck below the chin that is painful  Cardiac: No palpitations. No DIEGO. No PND. No Orthopnea.   Respiratory: No Shortness of Breath. No wheezing. No cough. No hemoptysis.   GI: No nausea/vomiting. No indigestion. No diarrhea. No constipation.   Extremities: No numbness or tingling. No paresthesias.   Urinary: No change in urinary frequency. No change in hesitancy. No hematuria. No incontinence.     Physical Exam  Constitutional:  Pleasant  Eyes: PERRL, EOMI,   ENMT: Anterior neck cellulitis, mild muffled voice.  No stridor.  Head/Neck: Neck supple, No JVD,   Respiratory/Thorax: Patent airways, CTAB,   Cardiovascular: Regular, rate and rhythm, no murmurs  Gastrointestinal: Soft, non-distended, +BS.  Musculoskeletal: ROM intact, no joint swelling, normal strength  Extremities: peripheral pulses intact; no edema  Neurological: Alert and Oriented x 3; no focal deficits; gross motor and sensation intact; CN II-XII intact. No asterixis.  Psychological: Appropriate mood and behavior  Skin: No lesions, No rashes.     Last Recorded Vitals  Blood pressure 132/80, pulse 74, temperature 36.3 °C (97.3 °F), temperature source Oral, resp. rate 17, height 1.854 m (6' 1\"), weight 102 kg (225 lb), SpO2 96%.    Relevant Results  Lab Results   Component Value Date     WBC 11.7 (H) 06/20/2025     HGB 13.9 06/20/2025     HCT 42.7 06/20/2025     MCV 84 06/20/2025      06/20/2025               Lab Results   Component Value Date     GLUCOSE 213 (H) 06/20/2025     CALCIUM 9.2 06/20/2025      (L) 06/20/2025     K 3.9 06/20/2025     CO2 30 06/20/2025     CL 95 (L) 06/20/2025     BUN 11 06/20/2025     CREATININE 0.73 06/20/2025               Lab Results   Component Value Date     HGBA1C 7.8 (H) 11/16/2024       CT SOFT TISSUE NECK W IV CONTRAST;  6/20/2025 6:36 pm      INDICATION:  Signs/Symptoms:submandibular abscess.          COMPARISON:  None.   "    ACCESSION NUMBER(S):  [id number]      ORDERING CLINICIAN:  FRANSISCA MONZON      TECHNIQUE:  Axial CT images of the neck were obtained.  The patient received 75  ML of Omnipaque 350 intravenous contrast agent. The images were  reformatted in angled axial, coronal and sagittal planes.      FINDINGS:  Noncontrast neck CT earlier today. Rim enhancing submental collection  measures 46 x 42 by 41 mm. There appears to be a phlegmonous tract  tracking along the right mylohyoid muscle and extending from the  right mandibular molar extraction cavity to the aforementioned  collection.      There is some posterior mass-effect upon the tongue base which  moderately narrows the oropharynx as a result.      No pathologically enlarged cervical chain lymph nodes. Orbits are  unremarkable. Paranasal sinuses are well-aerated.      Dentition as described on report of earlier study.      IMPRESSION:  Large submental abscess. This measures up to 4.6 cm. There appears to  be a phlegmonous tract tracking along the right mylohyoid muscle and  extending from the right mandibular molar extraction cavity to the  aforementioned collection.      MACRO:  None      Signed by: Lisandro Leon 6/20/2025 6:57 PM  Dictation workstation:   [id number]       Assessment/Plan   Admit to [facility]  IV antibiotics - Unasyn q6h  NPO after midnight  I&D of neck abscess planned for Saturday afternoon.    I spent 45 minutes in the professional and overall care of this patient.      Jarvis Kwong DMD

## 2025-06-21 NOTE — PROGRESS NOTES
Adam Contreras is a 64 y.o. male on day 1 of admission presenting with Submental abscess.      Subjective   Doing well  Awaiting surgery at 3:15 PM  He has difficulty swallowing but he is managing secretions  No fever chills shortness of breath       Objective     Last Recorded Vitals  /80 (BP Location: Left arm, Patient Position: Lying)   Pulse 74   Temp 36.3 °C (97.3 °F) (Oral)   Resp 17   Wt 102 kg (225 lb)   SpO2 96%   Intake/Output last 3 Shifts:    Intake/Output Summary (Last 24 hours) at 6/21/2025 1215  Last data filed at 6/21/2025 0936  Gross per 24 hour   Intake 1070 ml   Output --   Net 1070 ml       Physical Exam  Alert oriented x 3 cooperative no acute distress speaks with normal sentences  No cephalic atraumatic EOMI PERRLA  There is significant submandibular swelling central which is tender.  Oral exam is fairly unremarkable though  Chest clear  Heart regular  Abdomen soft nontender extremities no edema  Neurologic exam nonfocal  Psych no psychosis  Skin no rash  Relevant Results  Reviewed  Assessment/Plan     Assessment & Plan  Submental abscess  Admit to [facility]  Continuous pulse oximetry  CT soft tissue neck with IV contrast appreciated:  Large submental abscess. This measures up to 4.6 cm. There appears to  be a phlegmonous tract tracking along the right mylohyoid muscle and  extending from the right mandibular molar extraction cavity to the  aforementioned collection.  Defer further management to OMFS specialist  Patient started on empiric IV antibiotics  Blood cultures requested  Strict n.p.o.  Patient is protecting his airway and not having excessive secretions  And expansion with IVF  Type and screen added on for a.m.    Abscess of submental region  Management as above    Leukocytosis  In the setting of submental abscess  Continue with empiric IV antibiotics  Follow-up blood culture    Hyperglycemia  Add on hemoglobin A1c level for a.m.    Hypertension  Continue to monitor BP and adjust  antihypertensive medications accordingly  IV hydralazine as needed as the patient is currently strict n.p.o.  Cellulitis and abscess of neck    Hyperlipidemia    Osteoarthritis of multiple joints    Vaping-related disorder      6/21,   Reviewed surgery today maintain n.p.o., continue antibiotics await cultures             Richard Ferreira MD

## 2025-06-21 NOTE — CARE PLAN
The patient's goals for the shift include Swelling will be decreased in size    The clinical goals for the shift include have I&D, control pain      Problem: Pain - Adult  Goal: Verbalizes/displays adequate comfort level or baseline comfort level  Outcome: Progressing     Problem: Safety - Adult  Goal: Free from fall injury  Outcome: Progressing     Problem: Discharge Planning  Goal: Discharge to home or other facility with appropriate resources  Outcome: Progressing     Problem: Chronic Conditions and Co-morbidities  Goal: Patient's chronic conditions and co-morbidity symptoms are monitored and maintained or improved  Outcome: Progressing     Problem: Nutrition  Goal: Nutrient intake appropriate for maintaining nutritional needs  Outcome: Progressing

## 2025-06-21 NOTE — NURSING NOTE
Assumed care.  Patient sitting on the edge of the bed, alert and oriented, complaints of pain and days shift Nurse will medicate.  Dressing intact and clean.  Other needs attended.  Safety measures ensured, call light in reach.

## 2025-06-21 NOTE — ANESTHESIA PREPROCEDURE EVALUATION
Patient: Adam Contreras    Procedure Information       Date: 06/21/25    Procedure: INCISION AND DRAINAGE, NECK (Bilateral)    Location: [facility]    Surgeons: Jarvis Kwong DMD        Medical History[1]     Relevant Problems   Cardiac   (+) Hyperlipidemia   (+) Hypertension      Musculoskeletal   (+) Osteoarthritis of multiple joints     Surgical History[2]   Clinical information reviewed:   Tobacco  Allergies  Meds  Problems  Med Hx  Surg Hx   Fam Hx  Soc   Hx        NPO Detail:  No data recorded     Physical Exam    Airway  Mallampati: II  TM distance: >3 FB  Neck ROM: full     Cardiovascular    Dental    Pulmonary    Abdominal            Anesthesia Plan    History of general anesthesia?: yes  History of complications of general anesthesia?: no    ASA 2     general     The patient is not a current smoker.  Patient was not previously instructed to abstain from smoking on day of procedure.  Patient did not smoke on day of procedure.    intravenous induction   Postoperative pain plan includes opioids.  Anesthetic plan and risks discussed with patient.    Plan discussed with attending.           [1] History reviewed. No pertinent past medical history.  [2] History reviewed. No pertinent surgical history.

## 2025-06-21 NOTE — H&P
History Of Present Illness      Adam Contreras is a 64 y.o. male presenting with Neck swelling from home. Experiencing a tooth infection for the past two weeks. Associated difficulty swallowing/clearing Saliva at home. Patient referred to the hospital by  from [facility]. Plan is for surgical intervention tomorrow on Saturday morning. Upon initial presentation w/ ED provider the patient is not having any difficulty in the room providing a history, no difficulty talking. He states the pain in his neck has been ongoing for last 2 weeks. He has been on antibiotics prescribed by his dentist. He has had subjective fever and chills.        Vital signs were stable in the ED.  Patient's Tmax was 36.5, pulse rate 95, respiratory rate 16, /85.  Patient was saturating 100% on room air.      ED diagnostic workup was noted for a minimal leukocytosis of 11.7.  The patient's H&H was stable at 13.9/42.7, respectively.  The patient's platelet count was normal at 305.  It was a minimal neutrophil predominance.  The patient's blood chemistry was noted for an elevated glucose of 213.  The sodium was 133 although within normal range when corrected for glucose elevation.  Patient's chloride was 95.    Initially a CT soft tissue neck without IV contrast was obtained followed by CT soft tissue neck with IV contrast.  The results for the 2 studies are noted below:    IMPRESSION:  Ill-defined soft tissue density material in the submental region,  suspected infectious process. Difficult to determine if abscess is  present without IV contrast.      Extraction cavity right mandibular 2nd molar. There is erosion of the  subjacent inferomedial mandibular cortex. There is suspected tracking  of inflammation along the right mylohyoid muscle into the submental  region and communication with the aforementioned infectious process.      Otherwise multifocal endodontal and periodontal disease as described.      IMPRESSION:  Large submental  abscess. This measures up to 4.6 cm. There appears to  be a phlegmonous tract tracking along the right mylohyoid muscle and  extending from the right mandibular molar extraction cavity to the  aforementioned collection.    In the ED the patient received Unasyn 3 g IV piggyback x 1.  Basic admission orders and pain medications including IV fluids were ordered by OMFS.        Past Medical History       CRP elevated 11/29/11    Elevated cholesterol 2/4/15    Elevated fasting glucose 2010    Essential hypertension, benign      Neck pain, chronic       from work injury    Tubular adenoma      Vitamin D insufficiency          Surgical History         KNEE ARTHROSCOP MENISCUS REPAIR MED/LAT   2/2011     left knee          Social History    Tobacco Use    Smoking status: Never    Smokeless tobacco: Never    Tobacco comments:       very rarely   Vaping Use    Vaping status: Never Used   Substance Use Topics    Alcohol use: Yes       Alcohol/week: 2.0 standard drinks of alcohol       Types: 2 Cans of Beer (12oz) per week    Drug use: No         Family History      Problem Relation Age of Onset    None Mother      Heart Father      None Sister      None Brother      None Brother      None Brother           Allergies      Patient has no known allergies.      Review of Systems    14-point ROS otherwise negative, as per HPI/Interval History.    General: No change in weight. No weakenss. No Fevers/Chills/Night Sweats   Skin: No skin/hair/nail changes. No rashes or sores.  Head:  No trauma. No Headache/nasuea/vomitting.   Eyes: No visual changes. No tearing. No itching.   Ears: No hearing loss. No tinnitus. No vertigo. No discharge.  Nose, Sinuses: No rhinorrhea, No nasal congestion. No epistaxis.  Mouth, Throat, Neck: No bleeding gums, hoarseness, sore throat or swollen neck below the chin that is painful  Cardiac: No palpitations. No DIEGO. No PND. No Orthopnea.   Respiratory: No Shortness of Breath. No wheezing. No cough. No  "hemoptysis.   GI: No nausea/vomiting. No indigestion. No diarrhea. No constipation.   Extremities: No numbness or tingling. No paresthesias.   Urinary: No change in urinary frequency. No change in hesitancy. No hematuria. No incontinence.           Physical Exam    Constitutional:  Pleasant  Eyes: PERRL, EOMI,   ENMT: mucous membranes moist.  Significant swelling below the chin.  No stridor.  Head/Neck: Neck supple, No JVD,   Respiratory/Thorax: Patent airways, CTAB,   Cardiovascular: Regular, rate and rhythm, no murmurs  Gastrointestinal: Soft, non-distended, +BS.  Musculoskeletal: ROM intact, no joint swelling, normal strength  Extremities: peripheral pulses intact; no edema  Neurological: Alert and Oriented x 3; no focal deficits; gross motor and sensation intact; CN II-XII intact. No asterixis.  Psychological: Appropriate mood and behavior  Skin: No lesions, No rashes.         Last Recorded Vitals  Blood pressure 154/85, pulse 95, temperature 36.5 °C (97.7 °F), resp. rate 16, height 1.854 m (6' 1\"), weight 102 kg (225 lb), SpO2 98%.    Relevant Results    Lab Results   Component Value Date    WBC 11.7 (H) 06/20/2025    HGB 13.9 06/20/2025    HCT 42.7 06/20/2025    MCV 84 06/20/2025     06/20/2025       Lab Results   Component Value Date    GLUCOSE 213 (H) 06/20/2025    CALCIUM 9.2 06/20/2025     (L) 06/20/2025    K 3.9 06/20/2025    CO2 30 06/20/2025    CL 95 (L) 06/20/2025    BUN 11 06/20/2025    CREATININE 0.73 06/20/2025       Lab Results   Component Value Date    HGBA1C 7.8 (H) 11/16/2024         No CT head results found for the past 12 months      Scheduled medications  Scheduled Medications[1]  Continuous medications  Continuous Medications[2]  PRN medications  PRN Medications[3]    Adam Contreras is a 64 y.o. male presenting with Neck swelling from home. Experiencing a tooth infection for the past two weeks. Associated difficulty swallowing/clearing Saliva at home. Patient referred to the " hospital by  from [facility] [place]. Plan is for surgical intervention tomorrow on Saturday morning. Upon initial presentation w/ ED provider the patient is not having any difficulty in the room providing a history, no difficulty talking. He states the pain in his neck has been ongoing for last 2 weeks. He has been on antibiotics prescribed by his dentist. He has had subjective fever and chills.  Patient admitted for further evaluation and management.      Assessment/Plan   Assessment & Plan  Submental abscess  Admit to RNF  Continuous pulse oximetry  CT soft tissue neck with IV contrast appreciated:  Large submental abscess. This measures up to 4.6 cm. There appears to  be a phlegmonous tract tracking along the right mylohyoid muscle and  extending from the right mandibular molar extraction cavity to the  aforementioned collection.  Defer further management to [facility] specialist  Patient started on empiric IV antibiotics  Blood cultures requested  Strict n.p.o.  Patient is protecting his airway and not having excessive secretions  And expansion with IVF  Type and screen added on for a.m.    Abscess of submental region  Management as above    Leukocytosis  In the setting of submental abscess  Continue with empiric IV antibiotics  Follow-up blood culture    Hyperglycemia  Add on hemoglobin A1c level for a.m.    Hypertension  Continue to monitor BP and adjust antihypertensive medications accordingly  IV hydralazine as needed as the patient is currently strict n.p.o.    GI/DVT prophylaxis  IV PPI  Lovenox subcu      Disposition:  Continue to monitor inpatient, await consultant recommendations, await test results, and await clinical improvement      This Dictation was Transcribed using a Nuance Dragon Voice Recognition System Device (with Compatible Computer + Software) and as such may contain Grammatical Errors and Unintentional Typing Misprints.      I spent 32 minutes in the professional and overall care of this  patient.      Fredy Wood MD           [1] ampicillin-sulbactam, 3 g, intravenous, q6h  [START ON 6/21/2025] enoxaparin, 40 mg, subcutaneous, Daily  pantoprazole, 40 mg, intravenous, Daily  [2] sodium chloride 0.9%, 75 mL/hr, Last Rate: 75 mL/hr (06/20/25 1611)  sodium chloride 0.9%, 100 mL/hr  [3] PRN medications: dextrose, dextrose, glucagon, glucagon, hydrALAZINE, HYDROmorphone, HYDROmorphone, ipratropium-albuteroL, morphine, ondansetron **OR** ondansetron

## 2025-06-21 NOTE — PERIOPERATIVE NURSING NOTE
"During PACU stay, patient coughing clear thick secretions out into emesis basin and self use of Yankauer suction at bedside. Pt periodically suctions own mouth via Yankauer. Pt complains of sore throat. Per Dr Kwong, \"he can swallow those secretions now, it is mostly saline from the procedure.\" Pt continues to spit secretions or suction them out of mouth per self. Dr Kwong stated \"I put in for him to have a soft diet.\" Pt requesting ice chips at this time. Pt tolerating ice chips.   1705-Pt requesting sips of water. Upon sipping water through straw, pt began to choke, coughing and unable to speak, Pt sitting straight up, pulse ox remained in place, O2 saturation reading 88-89% on room air, anesthesia called to bedside and NRB mask on 10L placed at this time by RN. Respiratory called by 2nd PACU RN. Once NRB mask was placed, pulse ox reading 95-97% on 10L via NRB and pt no longer coughing, pt skin color returned to normal. Anesthesia arrived at bedside along with respiratory, patient now able to tolerate his own secretions and speak at normal tone, NRB at 10L remains in place. Pt states \"I still feel sore, but my saliva secretions are a little better\". Pt removing mask per self, pulse ox reading % at this time on RA. Per anesthesia, \"He is looking better now.\" Ok to return to division bed per anesthesia. While patient had choking episode, his neck dressing began to fall off. I reinforced the dressing with clean sterile gauze and wrapped with Kerlix. Division nurse updated on patient status and episode of choking with sip of water via straw.   1715-Pt remains A&Ox4, HOB >60 degrees, and able to tolerate own secretions at this time. Pulse ox reading % on room air.   "

## 2025-06-21 NOTE — CARE PLAN
The patient's goals for the shift include Swelling will be decreased in size    The clinical goals for the shift include Able to swallow with no pain

## 2025-06-21 NOTE — OP NOTE
INCISION AND DRAINAGE, NECK (B) Operative Note     Date: 2025 - 2025  OR Location: [facility]    Name: Adam Contreras, : 1960, Age: 64 y.o., MRN: [id number], Sex: male    Diagnosis  Pre-op Diagnosis      * Cellulitis and abscess of neck [L03.221, L02.11] Post-op Diagnosis     * Cellulitis and abscess of neck [L03.221, L02.11]     Procedures  INCISION AND DRAINAGE, NECK   - UT I&D DEEP ABSC/HMTMA SOFT TISSUE NECK/THORAX      Surgeons      * Jarvis Kwong - Primary    Resident/Fellow/Other Assistant:  Surgeons and Role:  * No surgeons found with a matching role *    Staff:   Circulator:   Scrub Person:   Surgical Assistant:     Anesthesia Staff: No anesthesia staff entered.    Procedure Summary  Anesthesia: General  ASA: II  Estimated Blood Loss: 10 mL  Intra-op Medications:   Administrations occurring from 1500 to 1555 on 25:   Medication Name Total Dose   ampicillin-sulbactam (Unasyn) 3 g in sodium chloride 0.9%  mL Cannot be calculated   dextrose 50 % injection 12.5 g Cannot be calculated   dextrose 50 % injection 25 g Cannot be calculated   enoxaparin (Lovenox) syringe 40 mg Cannot be calculated   glucagon (Glucagen) injection 1 mg Cannot be calculated   glucagon (Glucagen) injection 1 mg Cannot be calculated   hydrALAZINE (Apresoline) injection 5 mg Cannot be calculated   HYDROmorphone (Dilaudid) injection 0.2 mg Cannot be calculated   HYDROmorphone (Dilaudid) injection 0.5 mg Cannot be calculated   ipratropium-albuteroL (Duo-Neb) 0.5-2.5 mg/3 mL nebulizer solution 3 mL Cannot be calculated   morphine injection 2 mg Cannot be calculated   pantoprazole (Protonix) injection 40 mg Cannot be calculated   phenoL (Chloraseptic) 1.4 % mouth/throat spray 1 spray Cannot be calculated              Anesthesia Record               Intraprocedure I/O Totals       None           Specimen: Neck Puss       Drains and/or Catheters: 1/2inch penrose drain placed in the submental  space    Tourniquet Times: N/A      Implants: None    Findings: Submental abscess tracking to the extraction site #31.    Indications: Adam Contreras is an 64 y.o. male who is having surgery for Cellulitis and abscess of neck [L03.221, L02.11].   Patient with persist neck abscess that did not respond to oral or IV antibiotics.      The patient was seen in the preoperative area. The risks, benefits, complications, treatment options, non-operative alternatives, expected recovery and outcomes were discussed with the patient. The possibilities of reaction to medication, pulmonary aspiration, injury to surrounding structures, bleeding, recurrent infection, the need for additional procedures, failure to diagnose a condition, and creating a complication requiring transfusion or operation were discussed with the patient. The patient concurred with the proposed plan, giving informed consent.  The site of surgery was properly noted/marked if necessary per policy. The patient has been actively warmed in preoperative area. Preoperative antibiotics are not indicated. Venous thrombosis prophylaxis have been ordered including bilateral sequential compression devices    Procedure Details:   Patient taken to the operating room in the care of the anesthesia team.  Patient placed on OR table and hooked up to patient monitors.  Time out performed to ensure patient ID and procedure with patient and treatment team.  Patient was intubated with an oral endotracheal tube without complication.  Tube was secured and care of patient transferred to my service.  Patient prepped and draped in normal oral and maxillofacial surgery fashion.  Attention was turned to the submental region.  Local anesthesia was injected.  No. 15 blade used to make a 2 cm incision 2 cm below the inferior border of the mandible in the submental region through skin.  Hemostat used to expose the platysma muscle. Platysma muscle was incised with bovie electrocautery.   Hemostat advanced with puss present.  Puss evacuated and sent for culture.  Digital dissection used to evacuate the submental space and bilateral submandibular spaces.  Area rinsed with copious saline irrigation.  1/2 penrose drain was placed into the neck and secured to skin with 2-0 prolene suture.  Fluffs and Kerlix placed to aid in hemostasis.      This completed all planned procedures.  Care of patient was returned to the anesthesia team for completion of general anesthesia and for extubation.  Patient extubated without complication.    Evidence of Infection: Yes; Pus Within the muscle/fascial layers  Complications:  None; patient tolerated the procedure well.    Disposition: PACU - hemodynamically stable.  Condition: stable     Task Performed by RNFA or Surgical Assistant:  N/A    Additional Details: N/A    Attending Attestation: I was present and scrubbed for the entire procedure.    Jarvis Kwong  Phone Number: [phone]

## 2025-06-21 NOTE — ASSESSMENT & PLAN NOTE
Admit to RNF  Continuous pulse oximetry  CT soft tissue neck with IV contrast appreciated:  Large submental abscess. This measures up to 4.6 cm. There appears to  be a phlegmonous tract tracking along the right mylohyoid muscle and  extending from the right mandibular molar extraction cavity to the  aforementioned collection.  Defer further management to OMFS specialist  Patient started on empiric IV antibiotics  Blood cultures requested  Strict n.p.o.  Patient is protecting his airway and not having excessive secretions  And expansion with IVF  Type and screen added on for a.m.

## 2025-06-21 NOTE — ASSESSMENT & PLAN NOTE
In the setting of submental abscess  Continue with empiric IV antibiotics  Follow-up blood culture

## 2025-06-21 NOTE — ANESTHESIA PROCEDURE NOTES
Airway  Date/Time: 6/21/2025 4:06 PM  Reason: elective    Airway not difficult    Staffing  Performed: CHUCK   Authorized by: Dayton García MD    Performed by: CHUCK Sheikh  Patient location during procedure: OR    Patient Condition  Indications for airway management: anesthesia  Patient position: sniffing  Sedation level: deep     Final Airway Details   Preoxygenated: yes  Final airway type: endotracheal airway  Successful airway: ETT  Cuffed: yes   Successful intubation technique: video laryngoscopy  Adjuncts used in placement: intubating stylet  Endotracheal tube insertion site: oral  Blade: Kimberli  Blade size: #3  ETT size (mm): 8.0  Cormack-Lehane Classification: grade I - full view of glottis  Placement verified by: chest auscultation and capnometry   Measured from: lips  ETT to lips (cm): 21  Ventilation between attempts: none  Number of attempts at approach: 1  Number of other approaches attempted: 0    Additional Comments  Lips and teeth in pre-anesthetic condition.

## 2025-06-21 NOTE — NURSING NOTE
Received report from ED Nurse.    2149H: Patient arrived on the floor per stretcher from ED.  No distress or discomfort noted.  Oriented to room.  All needs attended.  Safety measures ensured and call light in reach.

## 2025-06-22 VITALS
RESPIRATION RATE: 18 BRPM | HEIGHT: 73 IN | TEMPERATURE: 98.1 F | BODY MASS INDEX: 29.82 KG/M2 | DIASTOLIC BLOOD PRESSURE: 68 MMHG | WEIGHT: 225 LBS | HEART RATE: 82 BPM | SYSTOLIC BLOOD PRESSURE: 134 MMHG | OXYGEN SATURATION: 99 %

## 2025-06-22 LAB
ALBUMIN SERPL BCP-MCNC: 3.4 G/DL (ref 3.4–5)
ALP SERPL-CCNC: 62 U/L (ref 33–136)
ALT SERPL W P-5'-P-CCNC: 9 U/L (ref 10–52)
ANION GAP SERPL CALCULATED.3IONS-SCNC: 13 MMOL/L (ref 10–20)
AST SERPL W P-5'-P-CCNC: 8 U/L (ref 9–39)
BACTERIA BLD CULT: NORMAL
BACTERIA SPEC CULT: ABNORMAL
BASOPHILS # BLD AUTO: 0.02 X10*3/UL (ref 0–0.1)
BASOPHILS NFR BLD AUTO: 0.2 %
BILIRUB SERPL-MCNC: 0.5 MG/DL (ref 0–1.2)
BUN SERPL-MCNC: 18 MG/DL (ref 6–23)
CALCIUM SERPL-MCNC: 8.8 MG/DL (ref 8.6–10.3)
CHLORIDE SERPL-SCNC: 100 MMOL/L (ref 98–107)
CO2 SERPL-SCNC: 26 MMOL/L (ref 21–32)
CREAT SERPL-MCNC: 0.6 MG/DL (ref 0.5–1.3)
EGFRCR SERPLBLD CKD-EPI 2021: >90 ML/MIN/1.73M*2
EOSINOPHIL # BLD AUTO: 0.01 X10*3/UL (ref 0–0.7)
EOSINOPHIL NFR BLD AUTO: 0.1 %
ERYTHROCYTE [DISTWIDTH] IN BLOOD BY AUTOMATED COUNT: 12.7 % (ref 11.5–14.5)
EST. AVERAGE GLUCOSE BLD GHB EST-MCNC: 375 MG/DL
FUNGUS SPEC FUNGUS STN: NORMAL
GLUCOSE BLD MANUAL STRIP-MCNC: 197 MG/DL (ref 74–99)
GLUCOSE BLD MANUAL STRIP-MCNC: 257 MG/DL (ref 74–99)
GLUCOSE BLD MANUAL STRIP-MCNC: 320 MG/DL (ref 74–99)
GLUCOSE SERPL-MCNC: 168 MG/DL (ref 74–99)
GRAM STN SPEC: ABNORMAL
GRAM STN SPEC: ABNORMAL
HBA1C MFR BLD: 14.7 % (ref ?–5.7)
HCT VFR BLD AUTO: 40.5 % (ref 41–52)
HGB BLD-MCNC: 13.3 G/DL (ref 13.5–17.5)
IMM GRANULOCYTES # BLD AUTO: 0.11 X10*3/UL (ref 0–0.7)
IMM GRANULOCYTES NFR BLD AUTO: 1 % (ref 0–0.9)
LYMPHOCYTES # BLD AUTO: 1.51 X10*3/UL (ref 1.2–4.8)
LYMPHOCYTES NFR BLD AUTO: 13.5 %
MCH RBC QN AUTO: 27.6 PG (ref 26–34)
MCHC RBC AUTO-ENTMCNC: 32.8 G/DL (ref 32–36)
MCV RBC AUTO: 84 FL (ref 80–100)
MONOCYTES # BLD AUTO: 0.91 X10*3/UL (ref 0.1–1)
MONOCYTES NFR BLD AUTO: 8.1 %
NEUTROPHILS # BLD AUTO: 8.62 X10*3/UL (ref 1.2–7.7)
NEUTROPHILS NFR BLD AUTO: 77.1 %
NRBC BLD-RTO: 0 /100 WBCS (ref 0–0)
PLATELET # BLD AUTO: 288 X10*3/UL (ref 150–450)
POTASSIUM SERPL-SCNC: 4.1 MMOL/L (ref 3.5–5.3)
PROT SERPL-MCNC: 6.3 G/DL (ref 6.4–8.2)
RBC # BLD AUTO: 4.82 X10*6/UL (ref 4.5–5.9)
SODIUM SERPL-SCNC: 135 MMOL/L (ref 136–145)
WBC # BLD AUTO: 11.2 X10*3/UL (ref 4.4–11.3)

## 2025-06-22 PROCEDURE — 2500000002 HC RX 250 W HCPCS SELF ADMINISTERED DRUGS (ALT 637 FOR MEDICARE OP, ALT 636 FOR OP/ED): Performed by: INTERNAL MEDICINE

## 2025-06-22 PROCEDURE — 2500000004 HC RX 250 GENERAL PHARMACY W/ HCPCS (ALT 636 FOR OP/ED)

## 2025-06-22 PROCEDURE — 85025 COMPLETE CBC W/AUTO DIFF WBC: CPT

## 2025-06-22 PROCEDURE — 83036 HEMOGLOBIN GLYCOSYLATED A1C: CPT | Mod: WESLAB | Performed by: INTERNAL MEDICINE

## 2025-06-22 PROCEDURE — 82947 ASSAY GLUCOSE BLOOD QUANT: CPT

## 2025-06-22 PROCEDURE — 2500000001 HC RX 250 WO HCPCS SELF ADMINISTERED DRUGS (ALT 637 FOR MEDICARE OP): Performed by: REGISTERED NURSE

## 2025-06-22 PROCEDURE — 2500000004 HC RX 250 GENERAL PHARMACY W/ HCPCS (ALT 636 FOR OP/ED): Performed by: INTERNAL MEDICINE

## 2025-06-22 PROCEDURE — 1210000001 HC SEMI-PRIVATE ROOM DAILY

## 2025-06-22 PROCEDURE — 99232 SBSQ HOSP IP/OBS MODERATE 35: CPT | Performed by: INTERNAL MEDICINE

## 2025-06-22 PROCEDURE — 36415 COLL VENOUS BLD VENIPUNCTURE: CPT

## 2025-06-22 PROCEDURE — 80053 COMPREHEN METABOLIC PANEL: CPT

## 2025-06-22 RX ORDER — INSULIN LISPRO 100 [IU]/ML
0-10 INJECTION, SOLUTION INTRAVENOUS; SUBCUTANEOUS
Status: DISPENSED | OUTPATIENT
Start: 2025-06-22

## 2025-06-22 RX ORDER — VANCOMYCIN 1.75 G/350ML
1250 INJECTION, SOLUTION INTRAVENOUS EVERY 12 HOURS
Status: DISPENSED | OUTPATIENT
Start: 2025-06-22

## 2025-06-22 RX ORDER — VANCOMYCIN HYDROCHLORIDE 1 G/20ML
INJECTION, POWDER, LYOPHILIZED, FOR SOLUTION INTRAVENOUS DAILY PRN
Status: DISPENSED | OUTPATIENT
Start: 2025-06-22

## 2025-06-22 RX ADMIN — BENZOCAINE AND MENTHOL 1 LOZENGE: 15; 3.6 LOZENGE ORAL at 20:49

## 2025-06-22 RX ADMIN — HYDROMORPHONE HYDROCHLORIDE 0.5 MG: 1 INJECTION, SOLUTION INTRAMUSCULAR; INTRAVENOUS; SUBCUTANEOUS at 15:46

## 2025-06-22 RX ADMIN — INSULIN LISPRO 2 UNITS: 100 INJECTION, SOLUTION INTRAVENOUS; SUBCUTANEOUS at 12:25

## 2025-06-22 RX ADMIN — AMPICILLIN SODIUM AND SULBACTAM SODIUM 3 G: 2; 1 INJECTION, POWDER, FOR SOLUTION INTRAMUSCULAR; INTRAVENOUS at 21:35

## 2025-06-22 RX ADMIN — AMPICILLIN SODIUM AND SULBACTAM SODIUM 3 G: 2; 1 INJECTION, POWDER, FOR SOLUTION INTRAMUSCULAR; INTRAVENOUS at 09:03

## 2025-06-22 RX ADMIN — VANCOMYCIN 1250 MG: 1.75 INJECTION, SOLUTION INTRAVENOUS at 19:55

## 2025-06-22 RX ADMIN — AMPICILLIN SODIUM AND SULBACTAM SODIUM 3 G: 2; 1 INJECTION, POWDER, FOR SOLUTION INTRAMUSCULAR; INTRAVENOUS at 15:07

## 2025-06-22 RX ADMIN — PANTOPRAZOLE SODIUM 40 MG: 40 INJECTION, POWDER, LYOPHILIZED, FOR SOLUTION INTRAVENOUS at 09:03

## 2025-06-22 RX ADMIN — AMPICILLIN SODIUM AND SULBACTAM SODIUM 3 G: 2; 1 INJECTION, POWDER, FOR SOLUTION INTRAMUSCULAR; INTRAVENOUS at 03:50

## 2025-06-22 RX ADMIN — INSULIN LISPRO 8 UNITS: 100 INJECTION, SOLUTION INTRAVENOUS; SUBCUTANEOUS at 18:19

## 2025-06-22 RX ADMIN — ENOXAPARIN SODIUM 40 MG: 40 INJECTION SUBCUTANEOUS at 09:03

## 2025-06-22 ASSESSMENT — COGNITIVE AND FUNCTIONAL STATUS - GENERAL
MOBILITY SCORE: 24
DAILY ACTIVITIY SCORE: 24

## 2025-06-22 ASSESSMENT — PAIN DESCRIPTION - LOCATION: LOCATION: NECK

## 2025-06-22 ASSESSMENT — PAIN SCALES - GENERAL
PAINLEVEL_OUTOF10: 7
PAINLEVEL_OUTOF10: 2
PAINLEVEL_OUTOF10: 0 - NO PAIN
PAINLEVEL_OUTOF10: 5 - MODERATE PAIN

## 2025-06-22 ASSESSMENT — PAIN - FUNCTIONAL ASSESSMENT
PAIN_FUNCTIONAL_ASSESSMENT: 0-10
PAIN_FUNCTIONAL_ASSESSMENT: 0-10

## 2025-06-22 ASSESSMENT — PAIN DESCRIPTION - ORIENTATION: ORIENTATION: MID

## 2025-06-22 ASSESSMENT — PAIN DESCRIPTION - DESCRIPTORS: DESCRIPTORS: ACHING

## 2025-06-22 NOTE — CARE PLAN
The patient's goals for the shift include Swelling will be decreased in size    The clinical goals for the shift include manage pain, monitor wound      Problem: Pain - Adult  Goal: Verbalizes/displays adequate comfort level or baseline comfort level  Outcome: Progressing     Problem: Safety - Adult  Goal: Free from fall injury  Outcome: Progressing     Problem: Discharge Planning  Goal: Discharge to home or other facility with appropriate resources  Outcome: Progressing     Problem: Chronic Conditions and Co-morbidities  Goal: Patient's chronic conditions and co-morbidity symptoms are monitored and maintained or improved  Outcome: Progressing     Problem: Nutrition  Goal: Nutrient intake appropriate for maintaining nutritional needs  Outcome: Progressing     Problem: Skin  Goal: Participates in plan/prevention/treatment measures  Outcome: Progressing  Goal: Promote skin healing  Outcome: Progressing

## 2025-06-22 NOTE — ASSESSMENT & PLAN NOTE
In the setting of submental abscess  Continue with empiric IV antibiotics  Follow-up blood culture, so far has been negative

## 2025-06-22 NOTE — ASSESSMENT & PLAN NOTE
Await surgical follow-up.  Will consult ID based on abundant gram-positive's and gram-negative's from wound tissue final results pending.  Will continue Unasyn for now as he does not appear septic and overall seems improving and will await ID input

## 2025-06-22 NOTE — CARE PLAN
Over the shift, the patient did not make progress toward the following goals. Barriers to progression include . Recommendations to address these barriers include .      Problem: Pain - Adult  Goal: Verbalizes/displays adequate comfort level or baseline comfort level  6/22/2025 0650 by Marvin Webb RN  Outcome: Progressing  6/21/2025 2239 by Marvin Webb RN  Outcome: Progressing  6/21/2025 2238 by Marvin Webb RN  Outcome: Progressing     Problem: Safety - Adult  Goal: Free from fall injury  6/22/2025 0650 by Marvin Webb RN  Outcome: Progressing  6/21/2025 2239 by Marvin Webb RN  Outcome: Progressing  6/21/2025 2238 by Marvin Webb RN  Outcome: Progressing     Problem: Discharge Planning  Goal: Discharge to home or other facility with appropriate resources  6/22/2025 0650 by Marvin Webb RN  Outcome: Progressing  6/21/2025 2239 by Marvin Webb RN  Outcome: Progressing  6/21/2025 2238 by Marvin Webb RN  Outcome: Progressing     Problem: Chronic Conditions and Co-morbidities  Goal: Patient's chronic conditions and co-morbidity symptoms are monitored and maintained or improved  6/22/2025 0650 by Marvin Webb RN  Outcome: Progressing  6/21/2025 2239 by Marvin Webb RN  Outcome: Progressing  6/21/2025 2238 by Marvin Webb RN  Outcome: Progressing     Problem: Nutrition  Goal: Nutrient intake appropriate for maintaining nutritional needs  6/22/2025 0650 by Marvin Webb RN  Outcome: Progressing  6/21/2025 2239 by Marvin Webb RN  Outcome: Progressing  6/21/2025 2238 by Marvin Webb RN  Outcome: Progressing     Problem: Skin  Goal: Participates in plan/prevention/treatment measures  6/22/2025 0650 by Marvin Webb RN  Outcome: Progressing  6/21/2025 2239 by Marvin Webb RN  Outcome: Progressing  Goal: Promote skin healing  6/22/2025 0650 by Marvin Erik Jon, RN  Outcome: Progressing  6/21/2025 2231 by  Marvin Webb, RN  Outcome: Progressing

## 2025-06-22 NOTE — NURSING NOTE
Noted dressing is soaked.  Dressing changed, penrose drain in place and intact.  Patient well tolerated.  Will continue to monitor dressing.

## 2025-06-22 NOTE — PROGRESS NOTES
Adam Contreras is a 64 y.o. male on day 2 of admission presenting with Submental abscess.      Subjective   Able to tolerate diet  Neck swelling has come down but not a lot pain is somewhat better no fever  Or chills notes reviewed       Objective     Last Recorded Vitals  /82 (BP Location: Left arm, Patient Position: Sitting)   Pulse 82   Temp 36.6 °C (97.9 °F) (Oral)   Resp 16   Wt 102 kg (225 lb)   SpO2 96%   Intake/Output last 3 Shifts:    Intake/Output Summary (Last 24 hours) at 6/22/2025 1016  Last data filed at 6/22/2025 0903  Gross per 24 hour   Intake 700 ml   Output --   Net 700 ml       Physical Exam  Just finished that no acute distress speaks full sentences  Normocephalic/atraumatic EOMI PERRLA  Area of abscess examined there is a drain small serosanguineous amount of drainage mild decrease in swelling  Chest clear  Heart regular  Abdomen soft nontender extremities no edema  Neurologic exam focal  Relevant Results  Reviewed  Assessment/Plan     Assessment & Plan  Submental abscess  Await surgical follow-up.  Will consult ID based on abundant gram-positive's and gram-negative's from wound tissue final results pending.  Will continue Unasyn for now as he does not appear septic and overall seems improving and will await ID input    Abscess of submental region  Management as above    Leukocytosis  In the setting of submental abscess  Continue with empiric IV antibiotics  Follow-up blood culture, so far has been negative    Hyperglycemia  Add on hemoglobin A1c level for a.m., has been elevated in the past we will recheck now he likely has diabetes will add on sliding scale    Hypertension  Continue to monitor BP and will restart lisinopril when appropriate  Cellulitis and abscess of neck    Hyperlipidemia    Osteoarthritis of multiple joints    Vaping-related disorder      6/22 as above             Richard Ferreira MD

## 2025-06-22 NOTE — ASSESSMENT & PLAN NOTE
Add on hemoglobin A1c level for a.m., has been elevated in the past we will recheck now he likely has diabetes will add on sliding scale

## 2025-06-22 NOTE — PROGRESS NOTES
Vancomycin Dosing by Pharmacy- INITIAL    Adam Contreras is a 64 y.o. year old male who Pharmacy has been consulted for vancomycin dosing for cellulitis, skin and soft tissue. Based on the patient's indication and renal status this patient will be dosed based on a goal AUC of 400-600.     Renal function is currently stable.    Visit Vitals  /82 (BP Location: Left arm, Patient Position: Sitting)   Pulse 82   Temp 36.6 °C (97.9 °F) (Oral)   Resp 16        Lab Results   Component Value Date    CREATININE 0.60 2025    CREATININE 0.62 2025    CREATININE 0.73 2025    CREATININE 0.97 2022        Patient weight is as follows:   Vitals:    25 1554   Weight: 102 kg (225 lb)       Cultures:  No results found for the encounter in last 14 days.        I/O last 3 completed shifts:  In: 1670 (16.4 mL/kg) [P.O.:200; I.V.:570 (5.6 mL/kg); IV Piggyback:900]  Out: - (0 mL/kg)   Weight: 102.1 kg   I/O during current shift:  I/O this shift:  In: 100 [IV Piggyback:100]  Out: -     Temp (24hrs), Av.4 °C (97.6 °F), Min:36.3 °C (97.3 °F), Max:36.8 °C (98.2 °F)         Assessment/Plan     Patient will not be given a loading dose.  Will initiate vancomycin maintenance, 1250 mg every 12 hours.    This dosing regimen is predicted by [employer] to result in the following pharmacokinetic parameters:  Loading dose: N/A  Regimen: 1250 mg IV every 12 hours.  Start time: 14:07 on 2025  Exposure target: AUC24 (range) 400-600 mg/L.hr   [id number]: 382 mg/L.hr  AUC24,ss: 455 mg/L.hr  Probability of AUC24 > 400: 64 %  Ctrough,ss: 13.9 mg/L  Probability of Ctrough,ss > 20: 21 %    Follow-up level will be ordered on  at 0500 unless clinically indicated sooner.  Will continue to monitor renal function daily while on vancomycin and order serum creatinine at least every 48 hours if not already ordered.  Follow for continued vancomycin needs, clinical response, and signs/symptoms of toxicity.       Yoanna WAGGONER  Ashutosh, PharmD

## 2025-06-23 LAB
ALBUMIN SERPL BCP-MCNC: 3 G/DL (ref 3.4–5)
ALP SERPL-CCNC: 55 U/L (ref 33–136)
ALT SERPL W P-5'-P-CCNC: 9 U/L (ref 10–52)
ANION GAP SERPL CALCULATED.3IONS-SCNC: 9 MMOL/L (ref 10–20)
AST SERPL W P-5'-P-CCNC: 10 U/L (ref 9–39)
BASOPHILS # BLD AUTO: 0.05 X10*3/UL (ref 0–0.1)
BASOPHILS NFR BLD AUTO: 0.7 %
BILIRUB SERPL-MCNC: 0.5 MG/DL (ref 0–1.2)
BUN SERPL-MCNC: 15 MG/DL (ref 6–23)
CALCIUM SERPL-MCNC: 8.5 MG/DL (ref 8.6–10.3)
CHLORIDE SERPL-SCNC: 99 MMOL/L (ref 98–107)
CO2 SERPL-SCNC: 31 MMOL/L (ref 21–32)
CREAT SERPL-MCNC: 0.74 MG/DL (ref 0.5–1.3)
EGFRCR SERPLBLD CKD-EPI 2021: >90 ML/MIN/1.73M*2
EOSINOPHIL # BLD AUTO: 0.09 X10*3/UL (ref 0–0.7)
EOSINOPHIL NFR BLD AUTO: 1.3 %
ERYTHROCYTE [DISTWIDTH] IN BLOOD BY AUTOMATED COUNT: 12.8 % (ref 11.5–14.5)
GLUCOSE BLD MANUAL STRIP-MCNC: 217 MG/DL (ref 74–99)
GLUCOSE BLD MANUAL STRIP-MCNC: 219 MG/DL (ref 74–99)
GLUCOSE BLD MANUAL STRIP-MCNC: 243 MG/DL (ref 74–99)
GLUCOSE BLD MANUAL STRIP-MCNC: 297 MG/DL (ref 74–99)
GLUCOSE SERPL-MCNC: 193 MG/DL (ref 74–99)
HCT VFR BLD AUTO: 38.9 % (ref 41–52)
HGB BLD-MCNC: 12.4 G/DL (ref 13.5–17.5)
IMM GRANULOCYTES # BLD AUTO: 0.1 X10*3/UL (ref 0–0.7)
IMM GRANULOCYTES NFR BLD AUTO: 1.4 % (ref 0–0.9)
LYMPHOCYTES # BLD AUTO: 2.32 X10*3/UL (ref 1.2–4.8)
LYMPHOCYTES NFR BLD AUTO: 32.4 %
MCH RBC QN AUTO: 27 PG (ref 26–34)
MCHC RBC AUTO-ENTMCNC: 31.9 G/DL (ref 32–36)
MCV RBC AUTO: 85 FL (ref 80–100)
MONOCYTES # BLD AUTO: 0.79 X10*3/UL (ref 0.1–1)
MONOCYTES NFR BLD AUTO: 11 %
NEUTROPHILS # BLD AUTO: 3.82 X10*3/UL (ref 1.2–7.7)
NEUTROPHILS NFR BLD AUTO: 53.2 %
NRBC BLD-RTO: 0 /100 WBCS (ref 0–0)
PLATELET # BLD AUTO: 278 X10*3/UL (ref 150–450)
POTASSIUM SERPL-SCNC: 3.9 MMOL/L (ref 3.5–5.3)
PROT SERPL-MCNC: 5.8 G/DL (ref 6.4–8.2)
RBC # BLD AUTO: 4.6 X10*6/UL (ref 4.5–5.9)
SODIUM SERPL-SCNC: 135 MMOL/L (ref 136–145)
VANCOMYCIN SERPL-MCNC: 5.5 UG/ML (ref 5–20)
WBC # BLD AUTO: 7.2 X10*3/UL (ref 4.4–11.3)

## 2025-06-23 PROCEDURE — 2500000004 HC RX 250 GENERAL PHARMACY W/ HCPCS (ALT 636 FOR OP/ED)

## 2025-06-23 PROCEDURE — 85025 COMPLETE CBC W/AUTO DIFF WBC: CPT

## 2025-06-23 PROCEDURE — 84075 ASSAY ALKALINE PHOSPHATASE: CPT

## 2025-06-23 PROCEDURE — 82947 ASSAY GLUCOSE BLOOD QUANT: CPT

## 2025-06-23 PROCEDURE — 80202 ASSAY OF VANCOMYCIN: CPT | Performed by: INTERNAL MEDICINE

## 2025-06-23 PROCEDURE — 2500000001 HC RX 250 WO HCPCS SELF ADMINISTERED DRUGS (ALT 637 FOR MEDICARE OP): Performed by: INTERNAL MEDICINE

## 2025-06-23 PROCEDURE — 2500000002 HC RX 250 W HCPCS SELF ADMINISTERED DRUGS (ALT 637 FOR MEDICARE OP, ALT 636 FOR OP/ED): Performed by: INTERNAL MEDICINE

## 2025-06-23 PROCEDURE — 36415 COLL VENOUS BLD VENIPUNCTURE: CPT

## 2025-06-23 PROCEDURE — 99232 SBSQ HOSP IP/OBS MODERATE 35: CPT | Performed by: INTERNAL MEDICINE

## 2025-06-23 PROCEDURE — 1210000001 HC SEMI-PRIVATE ROOM DAILY

## 2025-06-23 PROCEDURE — 2500000004 HC RX 250 GENERAL PHARMACY W/ HCPCS (ALT 636 FOR OP/ED): Performed by: INTERNAL MEDICINE

## 2025-06-23 RX ORDER — LISINOPRIL 10 MG/1
10 TABLET ORAL DAILY
Status: DISCONTINUED | OUTPATIENT
Start: 2025-06-23 | End: 2025-06-24 | Stop reason: HOSPADM

## 2025-06-23 RX ADMIN — VANCOMYCIN 1250 MG: 1.75 INJECTION, SOLUTION INTRAVENOUS at 08:36

## 2025-06-23 RX ADMIN — LISINOPRIL 10 MG: 10 TABLET ORAL at 15:27

## 2025-06-23 RX ADMIN — AMPICILLIN SODIUM AND SULBACTAM SODIUM 3 G: 2; 1 INJECTION, POWDER, FOR SOLUTION INTRAMUSCULAR; INTRAVENOUS at 21:34

## 2025-06-23 RX ADMIN — ENOXAPARIN SODIUM 40 MG: 40 INJECTION SUBCUTANEOUS at 08:36

## 2025-06-23 RX ADMIN — INSULIN LISPRO 4 UNITS: 100 INJECTION, SOLUTION INTRAVENOUS; SUBCUTANEOUS at 08:36

## 2025-06-23 RX ADMIN — AMPICILLIN SODIUM AND SULBACTAM SODIUM 3 G: 2; 1 INJECTION, POWDER, FOR SOLUTION INTRAMUSCULAR; INTRAVENOUS at 10:18

## 2025-06-23 RX ADMIN — INSULIN LISPRO 4 UNITS: 100 INJECTION, SOLUTION INTRAVENOUS; SUBCUTANEOUS at 17:01

## 2025-06-23 RX ADMIN — INSULIN LISPRO 4 UNITS: 100 INJECTION, SOLUTION INTRAVENOUS; SUBCUTANEOUS at 12:11

## 2025-06-23 RX ADMIN — AMPICILLIN SODIUM AND SULBACTAM SODIUM 3 G: 2; 1 INJECTION, POWDER, FOR SOLUTION INTRAMUSCULAR; INTRAVENOUS at 15:27

## 2025-06-23 RX ADMIN — AMPICILLIN SODIUM AND SULBACTAM SODIUM 3 G: 2; 1 INJECTION, POWDER, FOR SOLUTION INTRAMUSCULAR; INTRAVENOUS at 04:11

## 2025-06-23 RX ADMIN — PANTOPRAZOLE SODIUM 40 MG: 40 INJECTION, POWDER, FOR SOLUTION INTRAVENOUS at 08:37

## 2025-06-23 RX ADMIN — VANCOMYCIN 1250 MG: 1.75 INJECTION, SOLUTION INTRAVENOUS at 19:50

## 2025-06-23 ASSESSMENT — COGNITIVE AND FUNCTIONAL STATUS - GENERAL
MOBILITY SCORE: 24
DAILY ACTIVITIY SCORE: 24

## 2025-06-23 ASSESSMENT — ENCOUNTER SYMPTOMS
CHILLS: 0
FEVER: 0
TROUBLE SWALLOWING: 1

## 2025-06-23 ASSESSMENT — PAIN - FUNCTIONAL ASSESSMENT: PAIN_FUNCTIONAL_ASSESSMENT: 0-10

## 2025-06-23 ASSESSMENT — PAIN SCALES - GENERAL
PAINLEVEL_OUTOF10: 0 - NO PAIN
PAINLEVEL_OUTOF10: 0 - NO PAIN

## 2025-06-23 NOTE — CARE PLAN
Over the shift, the patient did not make progress toward the following goals. Barriers to progression include . Recommendations to address these barriers include .      Problem: Pain - Adult  Goal: Verbalizes/displays adequate comfort level or baseline comfort level  6/23/2025 0654 by Marvin Webb RN  Outcome: Progressing  6/23/2025 0049 by Marvin Webb RN  Outcome: Progressing     Problem: Safety - Adult  Goal: Free from fall injury  6/23/2025 0654 by Marvin Webb RN  Outcome: Progressing  6/23/2025 0049 by Marvin Webb RN  Outcome: Progressing     Problem: Discharge Planning  Goal: Discharge to home or other facility with appropriate resources  6/23/2025 0654 by Marvin Webb RN  Outcome: Progressing  6/23/2025 0049 by Marvin Webb RN  Outcome: Progressing     Problem: Chronic Conditions and Co-morbidities  Goal: Patient's chronic conditions and co-morbidity symptoms are monitored and maintained or improved  6/23/2025 0654 by Marvin Webb RN  Outcome: Progressing  6/23/2025 0049 by Marvin Webb RN  Outcome: Progressing     Problem: Nutrition  Goal: Nutrient intake appropriate for maintaining nutritional needs  6/23/2025 0654 by Marvin Webb RN  Outcome: Progressing  6/23/2025 0049 by Marvin Webb RN  Outcome: Progressing     Problem: Skin  Goal: Participates in plan/prevention/treatment measures  6/23/2025 0654 by Marvin Webb RN  Outcome: Progressing  6/23/2025 0049 by Marvin Webb RN  Outcome: Progressing  Goal: Promote skin healing  6/23/2025 0654 by Marvin Webb RN  Outcome: Progressing  6/23/2025 0049 by Marvin Webb RN  Outcome: Progressing

## 2025-06-23 NOTE — CONSULTS
Inpatient consult to Infectious Diseases  Consult performed by: Marco Escobar MD  Consult ordered by: Richard Ferreira MD            Primary MD: GENERIC EXTERNAL DATA PROVIDER    Reason For Consult  Right neck abscess    History Of Present Illness  Adam Contreras is a 64 y.o. male presenting with pain swelling and redness of his right neck.  He had tooth extraction about a week prior to onset.  He was placed on antibiotic therapy without any improvement, and came to the hospital for further care.  He had incision and drainage done.  Wound cultures pending.  He had difficulty with swallowing but this is improving.  He was seen at this significant other.  He denies any fever or chills.  Denies any chest pain or shortness of breath.  He is on vancomycin and IV Unasyn.       Past Medical History  He has no past medical history on file.    Surgical History  He has no past surgical history on file.     Social History     Occupational History    Not on file   Tobacco Use    Smoking status: Never     Passive exposure: Never    Smokeless tobacco: Never   Vaping Use    Vaping status: Never Used   Substance and Sexual Activity    Alcohol use: Yes     Comment: barely per pt    Drug use: Never     Types: Other    Sexual activity: Not on file     Travel History   Travel since 05/23/25    No documented travel since 05/23/25         Family History  Family History[1]  Allergies  Patient has no known allergies.       There is no immunization history on file for this patient.  Medications  Home medications:  Prescriptions Prior to Admission[2]  Current medications:  Scheduled medications  Scheduled Medications[3]  Continuous medications  Continuous Medications[4]  PRN medications  PRN Medications[5]    Review of Systems   Constitutional:  Negative for chills and fever.   HENT:  Positive for trouble swallowing.         Objective  Range of Vitals (last 24 hours)  Heart Rate:  [82-86]   Temp:  [36.7 °C (98.1 °F)-36.8 °C  (98.2 °F)]   Resp:  [18]   BP: (134-148)/(68-79)   SpO2:  [97 %-99 %]   Daily Weight  06/20/25 : 102 kg (225 lb)    Body mass index is 29.69 kg/m².     Physical Exam  Constitutional:       Appearance: Normal appearance.   HENT:      Head: Normocephalic and atraumatic.      Right Ear: External ear normal.      Left Ear: External ear normal.      Nose: Nose normal.   Eyes:      General: No scleral icterus.     Extraocular Movements: Extraocular movements intact.      Conjunctiva/sclera: Conjunctivae normal.   Neck:      Comments: Right-sided neck swelling, wound with drain in place  Cardiovascular:      Rate and Rhythm: Normal rate and regular rhythm.      Heart sounds: Normal heart sounds, S1 normal and S2 normal.   Pulmonary:      Breath sounds: Normal breath sounds and air entry.   Abdominal:      General: Bowel sounds are normal.      Palpations: Abdomen is soft.      Tenderness: There is no abdominal tenderness.   Musculoskeletal:      Cervical back: Normal range of motion and neck supple.      Right lower leg: No edema.      Left lower leg: No edema.   Skin:     General: Skin is warm and dry.   Neurological:      Mental Status: He is alert.   Psychiatric:         Behavior: Behavior normal. Behavior is cooperative.          Relevant Results  Outside Hospital Results    Labs  Results from last 72 hours   Lab Units 06/23/25  0535 06/22/25  0651 06/21/25  0619   WBC AUTO x10*3/uL 7.2 11.2 10.6   HEMOGLOBIN g/dL 12.4* 13.3* 13.8   HEMATOCRIT % 38.9* 40.5* 41.9   PLATELETS AUTO x10*3/uL 278 288 247   NEUTROS PCT AUTO % 53.2 77.1 74.3   LYMPHS PCT AUTO % 32.4 13.5 14.7   MONOS PCT AUTO % 11.0 8.1 9.2   EOS PCT AUTO % 1.3 0.1 0.7     Results from last 72 hours   Lab Units 06/23/25  0534 06/22/25  0651 06/21/25  0619   SODIUM mmol/L 135* 135* 134*   POTASSIUM mmol/L 3.9 4.1 3.9   CHLORIDE mmol/L 99 100 99   CO2 mmol/L 31 26 28   BUN mg/dL 15 18 10   CREATININE mg/dL 0.74 0.60 0.62   GLUCOSE mg/dL 193* 168* 203*   CALCIUM  "mg/dL 8.5* 8.8 8.7   ANION GAP mmol/L 9* 13 11   EGFR mL/min/1.73m*2 >90 >90 >90     Results from last 72 hours   Lab Units 06/23/25  0534 06/22/25  0651 06/21/25  0619   ALK PHOS U/L 55 62 68   BILIRUBIN TOTAL mg/dL 0.5 0.5 0.7   PROTEIN TOTAL g/dL 5.8* 6.3* 6.2*   ALT U/L 9* 9* 10   AST U/L 10 8* 9   ALBUMIN g/dL 3.0* 3.4 3.4     Estimated Creatinine Clearance: 124.9 mL/min (by C-G formula based on SCr of 0.74 mg/dL).  No results found for: \"CRP\", \"SEDRATE\"  No results found for: \"HIV1X2\", \"HIVCONF\", \"XVZKQZ1MY\"  No results found for: \"HEPCABINIT\", \"HEPCAB\", \"HCVPCRQUANT\"  Microbiology  Reviewed-blood and wound cultures pending  Imaging  CT soft tissue neck w IV contrast  Result Date: 6/20/2025  Interpreted By:  Lisandro Leon, STUDY: CT SOFT TISSUE NECK W IV CONTRAST;  6/20/2025 6:36 pm   INDICATION: Signs/Symptoms:submandibular abscess.     COMPARISON: None.   ACCESSION NUMBER(S): [id number]   ORDERING CLINICIAN: FRANSISCA MONZON   TECHNIQUE: Axial CT images of the neck were obtained.  The patient received 75 ML of Omnipaque 350 intravenous contrast agent. The images were reformatted in angled axial, coronal and sagittal planes.   FINDINGS: Noncontrast neck CT earlier today. Rim enhancing submental collection measures 46 x 42 by 41 mm. There appears to be a phlegmonous tract tracking along the right mylohyoid muscle and extending from the right mandibular molar extraction cavity to the aforementioned collection.   There is some posterior mass-effect upon the tongue base which moderately narrows the oropharynx as a result.   No pathologically enlarged cervical chain lymph nodes. Orbits are unremarkable. Paranasal sinuses are well-aerated.   Dentition as described on report of earlier study.       Large submental abscess. This measures up to 4.6 cm. There appears to be a phlegmonous tract tracking along the right mylohyoid muscle and extending from the right mandibular molar extraction cavity to the aforementioned " collection.   MACRO: None   Signed by: Lisandro Leon 6/20/2025 6:57 PM Dictation workstation:   [id number]    CT soft tissue neck wo IV contrast  Result Date: 6/20/2025  Interpreted By:  Lisandro Leon, STUDY: CT SOFT TISSUE NECK WO IV CONTRAST;  6/20/2025 4:41 pm   INDICATION: Signs/Symptoms:oral swelling, abscess.     COMPARISON: None.   ACCESSION NUMBER(S): [id number]   ORDERING CLINICIAN: FRANSISCA MONZON   TECHNIQUE: Axial CT images of the neck were obtained. The images were reformatted in angled axial, coronal and sagittal planes.   FINDINGS: Ill-defined soft tissue density material in the submental region, suspected infectious process. This measures 53 x 40 mm greatest axial dimensions and 39 mm cc.   Extraction cavity right mandibular 2nd molar. There is erosion of the subjacent inferomedial mandibular cortex. There is suspect tracking of inflammation along the right mylohyoid muscle into the submental region and communication with the aforementioned infectious process.   Periodontal inflammation involving the partially impacted left mandibular 3rd molar.   Prominent periodontal inflammation and carious involvement involving the right maxillary 2nd premolar. Retained fragment of the left mandibular 2nd premolar.   Normal-sized thyroid without suspicious mass. Parotid and submandibular glands are unremarkable.   No mass in the visualized portions intracranial compartment. Paranasal sinuses and mastoid air cells are well aerated.   No destructive osseous lesions or acute osseous abnormalities.   No flow-limiting stenosis throughout the arterial structures of the neck.       Ill-defined soft tissue density material in the submental region, suspected infectious process. Difficult to determine if abscess is present without IV contrast.   Extraction cavity right mandibular 2nd molar. There is erosion of the subjacent inferomedial mandibular cortex. There is suspected tracking of inflammation along the right  mylohyoid muscle into the submental region and communication with the aforementioned infectious process.   Otherwise multifocal endodontal and periodontal disease as described.   MACRO: None   Signed by: Lisandro Leon 6/20/2025 4:56 PM Dictation workstation:   [id number]      Assessment/Plan   Right-sided neck abscess s/p I&D  Uncontrolled type 2 diabetes, hemoglobin A1c 14.7    Continue vancomycin  Continue Unasyn  Follow-up blood cultures  Follow-up wound culture  Dental surgery follow-up  Supportive care  Monitor temperature and WBC  Evaluate for de-escalation in 1 to 2 days    This is a complex infectious disease issue and the following was performed today (for more details please see the above note): Management decisions reflecting the added complexity (e.g., changes in antimicrobial therapy, infection control strategies).       Marco Escobar MD         [1] No family history on file.  [2]   Medications Prior to Admission   Medication Sig Dispense Refill Last Dose/Taking    amoxicillin-clavulanate (Augmentin) 875-125 mg tablet Take 1 tablet (875 mg of amoxicillin) by mouth 2 times a day.   6/20/2025    lisinopril 20 mg tablet Take 1 tablet (20 mg) by mouth once daily.   6/20/2025    naproxen sodium (Aleve) 220 mg tablet Take 2 tablets (440 mg) by mouth every 12 hours if needed for mild pain (1 - 3).   Past Week   [3] ampicillin-sulbactam, 3 g, intravenous, q6h  enoxaparin, 40 mg, subcutaneous, Daily  insulin lispro, 0-10 Units, subcutaneous, TID AC  pantoprazole, 40 mg, intravenous, Daily  vancomycin, 1,250 mg, intravenous, q12h    [4]    [5] PRN medications: benzocaine-menthol, dextrose, dextrose, glucagon, glucagon, hydrALAZINE, HYDROmorphone, HYDROmorphone, ipratropium-albuteroL, morphine, ondansetron **OR** ondansetron, phenoL, vancomycin

## 2025-06-23 NOTE — PROGRESS NOTES
"Adam Contreras is a 64 y.o. male on day 3 of admission presenting with Submental abscess.    Subjective   He underwent incision and drainage of the submental abscess on 6/21.  He has no acute complaints.  He is on IV Unasyn and vancomycin.  An ID consult was requested    Objective     Physical Exam  General: awake, alert, oriented, responsive  Neck: there was a wound in the submental region packed with a dressing, no active drainage or bleeding.   Cardiovascular: regular, normal S1 and S2  Lungs: good air entry bilaterally, clear to auscultation  Extremities: no peripheral cyanosis, no pedal edema  Neuro: alert, oriented x 3, no focal weakness      Last Recorded Vitals  Blood pressure 148/79, pulse 86, temperature 36.8 °C (98.2 °F), temperature source Oral, resp. rate 18, height 1.854 m (6' 1\"), weight 102 kg (225 lb), SpO2 97%.  Intake/Output last 3 Shifts:  I/O last 3 completed shifts:  In: 950 (9.3 mL/kg) [IV Piggyback:950]  Out: - (0 mL/kg)   Weight: 102.1 kg     Relevant Results  Lab Results   Component Value Date    WBC 7.2 06/23/2025    HGB 12.4 (L) 06/23/2025    HCT 38.9 (L) 06/23/2025    MCV 85 06/23/2025     06/23/2025     Lab Results   Component Value Date    GLUCOSE 193 (H) 06/23/2025    CALCIUM 8.5 (L) 06/23/2025     (L) 06/23/2025    K 3.9 06/23/2025    CO2 31 06/23/2025    CL 99 06/23/2025    BUN 15 06/23/2025    CREATININE 0.74 06/23/2025     Scheduled medications  Scheduled Medications[1]  Continuous medications  Continuous Medications[2]  PRN medications  PRN Medications[3]      Assessment & Plan    Submental abscess  ID consulted based on abundant gram-positive's and gram-negative's from wound tissue final results pending.    Continue IV Unasyn and vancomycin    Leukocytosis  In the setting of submental abscess  Resolved    Hyperglycemia  Add on hemoglobin A1c level for a.m., has been elevated in the past we will recheck now he likely has diabetes will add on sliding " scale  Hemoglobin A1c was 14.7% on 6/22    Hypertension  Lisinopril    Plan  Continue IV antibiotics per ID  Restart lisinopril 10 mg daily.  He takes 20 mg daily at home  Monitor blood glucose      Elie Escobar MD         [1] ampicillin-sulbactam, 3 g, intravenous, q6h  enoxaparin, 40 mg, subcutaneous, Daily  insulin lispro, 0-10 Units, subcutaneous, TID AC  pantoprazole, 40 mg, intravenous, Daily  vancomycin, 1,250 mg, intravenous, q12h  [2]    [3] PRN medications: benzocaine-menthol, dextrose, dextrose, glucagon, glucagon, hydrALAZINE, HYDROmorphone, HYDROmorphone, ipratropium-albuteroL, morphine, ondansetron **OR** ondansetron, phenoL, vancomycin

## 2025-06-23 NOTE — PROGRESS NOTES
06/23/25 1228   Discharge Planning   Expected Discharge Disposition Home     Per chart review pt independent from home no therapy ordered and last [facility] score 24. Patient has no needs from case management. Please consult if needs arise

## 2025-06-23 NOTE — PROGRESS NOTES
Spiritual Care Visit  Spiritual Care Request    Reason for Visit:  Routine Visit: Introduction     Request Received From:       Focus of Care:  Visited With: Patient         Refer to :          Spiritual Care Assessment    Spiritual Assessment:                      Care Provided:  Intended Effects: Establish rapport and connectedness, Build relationship of care and support, Convey a calming presence, Demonstrate caring and concern, Lessen someone's feelings of isolation  Methods: Offer emotional support  Interventions: Explain  role    Sense of Community and or Zoroastrianism Affiliation:  None         Addressed Needs/Concerns and/or Liliane Through:  Zoroastrianism Encounters  Zoroastrianism Needs: Prayer       Outcome:  Outcome of Spiritual Care Visit: Identifying spiritual/emotional issues, Comfort/healing presence, Support system identified, Identifying patient's strengths/source of hope     Advance Directives:         Spiritual Care Annotation        Annotation:  provided patient support while rounding the Unit.  introduced  services of [facility].   explained the role of the  in providing emotional and spiritual support for patient's and family while in admitted to the hospital.     Patient was resting slightly inclined in his hospital bed, Patient appears comfortable and pain free. Patient had a bandage around his neck, patient states that he had oral surgery two weeks ago and experienced infection with fluid. Patient required hospitalization to drain the fluid.  Patient was watching TV news.  Patient talked about current events. Patient states that he has a good support system. Patient requested prayer which was offered.      No other spiritual or Jainism needs were expressed. Spiritual care will remain available for support as requested.

## 2025-06-23 NOTE — NURSING NOTE
Assumed care.  Seen patient sitting at the edge of bed.  No distress or discomfort noted.  Denies pain and other needs.  Patient is alert and oriented x4.  Call light in reach.  Plan of care ongoing.

## 2025-06-23 NOTE — CARE PLAN
The clinical goals for the shift include Pain management.  Continue treatment regimen.  Monitor wound.

## 2025-06-23 NOTE — PROGRESS NOTES
Vancomycin Dosing by Pharmacy- FOLLOW UP    Adam Contreras is a 64 y.o. year old male who Pharmacy has been consulted for vancomycin dosing for cellulitis, skin and soft tissue. Based on the patient's indication and renal status this patient is being dosed based on a goal AUC of 400-600.     Renal function is currently stable.    Current vancomycin dose: 1250 mg given every 12 hours    Estimated vancomycin AUC on current dose: 445 mg/L.hr     Visit Vitals  /79 (BP Location: Left arm, Patient Position: Sitting)   Pulse 86   Temp 36.8 °C (98.2 °F) (Oral)   Resp 18        Lab Results   Component Value Date    CREATININE 0.74 2025    CREATININE 0.60 2025    CREATININE 0.62 2025    CREATININE 0.73 2025        Patient weight is as follows:   Vitals:    25 1554   Weight: 102 kg (225 lb)       Cultures:  No results found for the encounter in last 14 days.       I/O last 3 completed shifts:  In: 950 (9.3 mL/kg) [IV Piggyback:950]  Out: - (0 mL/kg)   Weight: 102.1 kg   I/O during current shift:  No intake/output data recorded.    Temp (24hrs), Av.8 °C (98.2 °F), Min:36.7 °C (98.1 °F), Max:36.8 °C (98.2 °F)      Assessment/Plan    Within goal AUC range. Continue current vancomycin regimen.    This dosing regimen is predicted by [employer] to result in the following pharmacokinetic parameters:    Regimen: 1250 mg IV every 12 hours.  Start time: 08:03 on 2025  Exposure target: AUC24 (range) 400-600 mg/L.hr   [id number]: 388 mg/L.hr  AUC24,ss: 445 mg/L.hr  Probability of AUC24 > 400: 67 %  Ctrough,ss: 14.3 mg/L  Probability of Ctrough,ss > 20: 16 %    The next level will be obtained on  at 0500. May be obtained sooner if clinically indicated.   Will continue to monitor renal function daily while on vancomycin and order serum creatinine at least every 48 hours if not already ordered.  Follow for continued vancomycin needs, clinical response, and signs/symptoms of toxicity.        Chas Scott, [facility]

## 2025-06-23 NOTE — CARE PLAN
Problem: Pain - Adult  Goal: Verbalizes/displays adequate comfort level or baseline comfort level  Outcome: Progressing     Problem: Safety - Adult  Goal: Free from fall injury  Outcome: Progressing     Problem: Discharge Planning  Goal: Discharge to home or other facility with appropriate resources  Outcome: Progressing     Problem: Chronic Conditions and Co-morbidities  Goal: Patient's chronic conditions and co-morbidity symptoms are monitored and maintained or improved  Outcome: Progressing     Problem: Nutrition  Goal: Nutrient intake appropriate for maintaining nutritional needs  Outcome: Progressing     Problem: Skin  Goal: Participates in plan/prevention/treatment measures  Outcome: Progressing  Goal: Promote skin healing  Outcome: Progressing   The patient's goals for the shift include Swelling will be decreased in size    The clinical goals for the shift include Pain management.  Continue treatment regimen.  Monitor wound.

## 2025-06-24 VITALS
HEART RATE: 69 BPM | SYSTOLIC BLOOD PRESSURE: 132 MMHG | TEMPERATURE: 97.5 F | BODY MASS INDEX: 29.82 KG/M2 | HEIGHT: 73 IN | RESPIRATION RATE: 18 BRPM | OXYGEN SATURATION: 99 % | WEIGHT: 225 LBS | DIASTOLIC BLOOD PRESSURE: 72 MMHG

## 2025-06-24 LAB
ALBUMIN SERPL BCP-MCNC: 3.6 G/DL (ref 3.4–5)
ALP SERPL-CCNC: 64 U/L (ref 33–136)
ALT SERPL W P-5'-P-CCNC: 13 U/L (ref 10–52)
ANION GAP SERPL CALCULATED.3IONS-SCNC: 11 MMOL/L (ref 10–20)
AST SERPL W P-5'-P-CCNC: 12 U/L (ref 9–39)
B-LACTAMASE ORGANISM ISLT: NEGATIVE
BACTERIA SPEC CULT: ABNORMAL
BASOPHILS # BLD AUTO: 0.07 X10*3/UL (ref 0–0.1)
BASOPHILS NFR BLD AUTO: 0.9 %
BILIRUB SERPL-MCNC: 0.6 MG/DL (ref 0–1.2)
BUN SERPL-MCNC: 10 MG/DL (ref 6–23)
CALCIUM SERPL-MCNC: 9.2 MG/DL (ref 8.6–10.3)
CHLORIDE SERPL-SCNC: 99 MMOL/L (ref 98–107)
CO2 SERPL-SCNC: 31 MMOL/L (ref 21–32)
CREAT SERPL-MCNC: 0.68 MG/DL (ref 0.5–1.3)
EGFRCR SERPLBLD CKD-EPI 2021: >90 ML/MIN/1.73M*2
EOSINOPHIL # BLD AUTO: 0.15 X10*3/UL (ref 0–0.7)
EOSINOPHIL NFR BLD AUTO: 1.8 %
ERYTHROCYTE [DISTWIDTH] IN BLOOD BY AUTOMATED COUNT: 12.6 % (ref 11.5–14.5)
GLUCOSE BLD MANUAL STRIP-MCNC: 207 MG/DL (ref 74–99)
GLUCOSE BLD MANUAL STRIP-MCNC: 223 MG/DL (ref 74–99)
GLUCOSE SERPL-MCNC: 213 MG/DL (ref 74–99)
GRAM STN SPEC: ABNORMAL
GRAM STN SPEC: ABNORMAL
HCT VFR BLD AUTO: 42.6 % (ref 41–52)
HGB BLD-MCNC: 14.2 G/DL (ref 13.5–17.5)
IMM GRANULOCYTES # BLD AUTO: 0.14 X10*3/UL (ref 0–0.7)
IMM GRANULOCYTES NFR BLD AUTO: 1.7 % (ref 0–0.9)
LYMPHOCYTES # BLD AUTO: 2.46 X10*3/UL (ref 1.2–4.8)
LYMPHOCYTES NFR BLD AUTO: 30.1 %
MCH RBC QN AUTO: 27.4 PG (ref 26–34)
MCHC RBC AUTO-ENTMCNC: 33.3 G/DL (ref 32–36)
MCV RBC AUTO: 82 FL (ref 80–100)
MONOCYTES # BLD AUTO: 1 X10*3/UL (ref 0.1–1)
MONOCYTES NFR BLD AUTO: 12.2 %
NEUTROPHILS # BLD AUTO: 4.35 X10*3/UL (ref 1.2–7.7)
NEUTROPHILS NFR BLD AUTO: 53.3 %
NRBC BLD-RTO: 0 /100 WBCS (ref 0–0)
PLATELET # BLD AUTO: 324 X10*3/UL (ref 150–450)
POTASSIUM SERPL-SCNC: 4.5 MMOL/L (ref 3.5–5.3)
PROT SERPL-MCNC: 6.9 G/DL (ref 6.4–8.2)
RBC # BLD AUTO: 5.19 X10*6/UL (ref 4.5–5.9)
SODIUM SERPL-SCNC: 136 MMOL/L (ref 136–145)
WBC # BLD AUTO: 8.2 X10*3/UL (ref 4.4–11.3)

## 2025-06-24 PROCEDURE — 2500000004 HC RX 250 GENERAL PHARMACY W/ HCPCS (ALT 636 FOR OP/ED)

## 2025-06-24 PROCEDURE — 2500000004 HC RX 250 GENERAL PHARMACY W/ HCPCS (ALT 636 FOR OP/ED): Performed by: INTERNAL MEDICINE

## 2025-06-24 PROCEDURE — 2500000002 HC RX 250 W HCPCS SELF ADMINISTERED DRUGS (ALT 637 FOR MEDICARE OP, ALT 636 FOR OP/ED): Performed by: INTERNAL MEDICINE

## 2025-06-24 PROCEDURE — 82947 ASSAY GLUCOSE BLOOD QUANT: CPT

## 2025-06-24 PROCEDURE — 99238 HOSP IP/OBS DSCHRG MGMT 30/<: CPT | Performed by: INTERNAL MEDICINE

## 2025-06-24 PROCEDURE — 80053 COMPREHEN METABOLIC PANEL: CPT

## 2025-06-24 PROCEDURE — 2500000001 HC RX 250 WO HCPCS SELF ADMINISTERED DRUGS (ALT 637 FOR MEDICARE OP): Performed by: INTERNAL MEDICINE

## 2025-06-24 PROCEDURE — 36415 COLL VENOUS BLD VENIPUNCTURE: CPT

## 2025-06-24 PROCEDURE — 85025 COMPLETE CBC W/AUTO DIFF WBC: CPT

## 2025-06-24 RX ORDER — DEXTROSE 4 G
TABLET,CHEWABLE ORAL
Qty: 1 EACH | Refills: 0 | Status: SHIPPED | OUTPATIENT
Start: 2025-06-24 | End: 2026-06-24

## 2025-06-24 RX ORDER — AMOXICILLIN AND CLAVULANATE POTASSIUM 875; 125 MG/1; MG/1
1 TABLET, FILM COATED ORAL 2 TIMES DAILY
Qty: 24 TABLET | Refills: 0 | Status: SHIPPED | OUTPATIENT
Start: 2025-06-24

## 2025-06-24 RX ORDER — METFORMIN HYDROCHLORIDE 500 MG/1
500 TABLET ORAL
Qty: 60 TABLET | Refills: 2 | Status: SHIPPED | OUTPATIENT
Start: 2025-06-24

## 2025-06-24 RX ORDER — LANCETS
EACH MISCELLANEOUS
Qty: 100 EACH | Refills: 2 | Status: SHIPPED | OUTPATIENT
Start: 2025-06-24

## 2025-06-24 RX ADMIN — INSULIN LISPRO 4 UNITS: 100 INJECTION, SOLUTION INTRAVENOUS; SUBCUTANEOUS at 08:50

## 2025-06-24 RX ADMIN — PANTOPRAZOLE SODIUM 40 MG: 40 INJECTION, POWDER, FOR SOLUTION INTRAVENOUS at 08:50

## 2025-06-24 RX ADMIN — AMPICILLIN SODIUM AND SULBACTAM SODIUM 3 G: 2; 1 INJECTION, POWDER, FOR SOLUTION INTRAMUSCULAR; INTRAVENOUS at 04:41

## 2025-06-24 RX ADMIN — VANCOMYCIN 1250 MG: 1.75 INJECTION, SOLUTION INTRAVENOUS at 08:50

## 2025-06-24 RX ADMIN — AMPICILLIN SODIUM AND SULBACTAM SODIUM 3 G: 2; 1 INJECTION, POWDER, FOR SOLUTION INTRAMUSCULAR; INTRAVENOUS at 10:27

## 2025-06-24 RX ADMIN — INSULIN LISPRO 4 UNITS: 100 INJECTION, SOLUTION INTRAVENOUS; SUBCUTANEOUS at 12:11

## 2025-06-24 RX ADMIN — LISINOPRIL 10 MG: 10 TABLET ORAL at 08:50

## 2025-06-24 RX ADMIN — ENOXAPARIN SODIUM 40 MG: 40 INJECTION SUBCUTANEOUS at 08:50

## 2025-06-24 ASSESSMENT — COGNITIVE AND FUNCTIONAL STATUS - GENERAL
DAILY ACTIVITIY SCORE: 24
MOBILITY SCORE: 24

## 2025-06-24 ASSESSMENT — PAIN SCALES - GENERAL: PAINLEVEL_OUTOF10: 0 - NO PAIN

## 2025-06-24 NOTE — PROGRESS NOTES
Adam Contreras is a 64 y.o. male on day 4 of admission presenting with Submental abscess.    Subjective   Interval History:   Afebrile, no chills  No right-sided neck pain  No chest pain or shortness of breath.  No nausea or diarrhea        Review of Systems   All other systems reviewed and are negative.      Objective   Range of Vitals (last 24 hours)  Heart Rate:  [65-72]   Temp:  [36.4 °C (97.5 °F)-36.6 °C (97.9 °F)]   Resp:  [18]   BP: (124-139)/(71-79)   SpO2:  [99 %-100 %]   Daily Weight  06/20/25 : 102 kg (225 lb)    Body mass index is 29.69 kg/m².    Physical Exam  Constitutional:       Appearance: Normal appearance.   HENT:      Head: Normocephalic and atraumatic.      Right Ear: External ear normal.      Left Ear: External ear normal.      Nose: Nose normal.   Eyes:      General: No scleral icterus.     Extraocular Movements: Extraocular movements intact.      Conjunctiva/sclera: Conjunctivae normal.   Neck:      Comments: Right-sided neck swelling, wound with drain in place  Cardiovascular:      Rate and Rhythm: Normal rate and regular rhythm.      Heart sounds: Normal heart sounds, S1 normal and S2 normal.   Pulmonary:      Breath sounds: Normal breath sounds and air entry.   Abdominal:      General: Bowel sounds are normal.      Palpations: Abdomen is soft.      Tenderness: There is no abdominal tenderness.   Musculoskeletal:      Cervical back: Normal range of motion and neck supple.      Right lower leg: No edema.      Left lower leg: No edema.   Skin:     General: Skin is warm and dry.   Neurological:      Mental Status: He is alert.   Psychiatric:         Behavior: Behavior normal. Behavior is cooperative.     Antibiotics  amoxicillin-clavulanate - 875-125 mg  ampicillin-sulbactam - 3 gram/100 mL  vancomycin - 1.25 gram/250 mL    Relevant Results  Labs  Results from last 72 hours   Lab Units 06/24/25  0557 06/23/25  0535 06/22/25  0651   WBC AUTO x10*3/uL 8.2 7.2 11.2   HEMOGLOBIN g/dL 14.2 12.4*  "13.3*   HEMATOCRIT % 42.6 38.9* 40.5*   PLATELETS AUTO x10*3/uL 324 278 288   NEUTROS PCT AUTO % 53.3 53.2 77.1   LYMPHS PCT AUTO % 30.1 32.4 13.5   MONOS PCT AUTO % 12.2 11.0 8.1   EOS PCT AUTO % 1.8 1.3 0.1     Results from last 72 hours   Lab Units 06/24/25  0557 06/23/25  0534 06/22/25  0651   SODIUM mmol/L 136 135* 135*   POTASSIUM mmol/L 4.5 3.9 4.1   CHLORIDE mmol/L 99 99 100   CO2 mmol/L 31 31 26   BUN mg/dL 10 15 18   CREATININE mg/dL 0.68 0.74 0.60   GLUCOSE mg/dL 213* 193* 168*   CALCIUM mg/dL 9.2 8.5* 8.8   ANION GAP mmol/L 11 9* 13   EGFR mL/min/1.73m*2 >90 >90 >90     Results from last 72 hours   Lab Units 06/24/25  0557 06/23/25  0534 06/22/25  0651   ALK PHOS U/L 64 55 62   BILIRUBIN TOTAL mg/dL 0.6 0.5 0.5   PROTEIN TOTAL g/dL 6.9 5.8* 6.3*   ALT U/L 13 9* 9*   AST U/L 12 10 8*   ALBUMIN g/dL 3.6 3.0* 3.4     Estimated Creatinine Clearance: 125 mL/min (by C-G formula based on SCr of 0.68 mg/dL).  No results found for: \"CRP\"  Microbiology  Reviewed-blood cultures pending, wound culture negative  Imaging  CT soft tissue neck w IV contrast  Result Date: 6/20/2025  Interpreted By:  Lisandro Leon, STUDY: CT SOFT TISSUE NECK W IV CONTRAST;  6/20/2025 6:36 pm   INDICATION: Signs/Symptoms:submandibular abscess.     COMPARISON: None.   ACCESSION NUMBER(S): [id number]   ORDERING CLINICIAN: FRANSISCA MONZON   TECHNIQUE: Axial CT images of the neck were obtained.  The patient received 75 ML of Omnipaque 350 intravenous contrast agent. The images were reformatted in angled axial, coronal and sagittal planes.   FINDINGS: Noncontrast neck CT earlier today. Rim enhancing submental collection measures 46 x 42 by 41 mm. There appears to be a phlegmonous tract tracking along the right mylohyoid muscle and extending from the right mandibular molar extraction cavity to the aforementioned collection.   There is some posterior mass-effect upon the tongue base which moderately narrows the oropharynx as a result.   No " pathologically enlarged cervical chain lymph nodes. Orbits are unremarkable. Paranasal sinuses are well-aerated.   Dentition as described on report of earlier study.       Large submental abscess. This measures up to 4.6 cm. There appears to be a phlegmonous tract tracking along the right mylohyoid muscle and extending from the right mandibular molar extraction cavity to the aforementioned collection.   MACRO: None   Signed by: Lisandro Leon 6/20/2025 6:57 PM Dictation workstation:   [id number]    CT soft tissue neck wo IV contrast  Result Date: 6/20/2025  Interpreted By:  Lisandro Leon, STUDY: CT SOFT TISSUE NECK WO IV CONTRAST;  6/20/2025 4:41 pm   INDICATION: Signs/Symptoms:oral swelling, abscess.     COMPARISON: None.   ACCESSION NUMBER(S): [id number]   ORDERING CLINICIAN: FRANSISCA MONZON   TECHNIQUE: Axial CT images of the neck were obtained. The images were reformatted in angled axial, coronal and sagittal planes.   FINDINGS: Ill-defined soft tissue density material in the submental region, suspected infectious process. This measures 53 x 40 mm greatest axial dimensions and 39 mm cc.   Extraction cavity right mandibular 2nd molar. There is erosion of the subjacent inferomedial mandibular cortex. There is suspect tracking of inflammation along the right mylohyoid muscle into the submental region and communication with the aforementioned infectious process.   Periodontal inflammation involving the partially impacted left mandibular 3rd molar.   Prominent periodontal inflammation and carious involvement involving the right maxillary 2nd premolar. Retained fragment of the left mandibular 2nd premolar.   Normal-sized thyroid without suspicious mass. Parotid and submandibular glands are unremarkable.   No mass in the visualized portions intracranial compartment. Paranasal sinuses and mastoid air cells are well aerated.   No destructive osseous lesions or acute osseous abnormalities.   No flow-limiting stenosis  throughout the arterial structures of the neck.       Ill-defined soft tissue density material in the submental region, suspected infectious process. Difficult to determine if abscess is present without IV contrast.   Extraction cavity right mandibular 2nd molar. There is erosion of the subjacent inferomedial mandibular cortex. There is suspected tracking of inflammation along the right mylohyoid muscle into the submental region and communication with the aforementioned infectious process.   Otherwise multifocal endodontal and periodontal disease as described.   MACRO: None   Signed by: Lisandro Leon 6/20/2025 4:56 PM Dictation workstation:   [id number]     Assessment/Plan   Right-sided neck abscess s/p I&D  Uncontrolled type 2 diabetes, hemoglobin A1c 14.7     Continue vancomycin  Continue Unasyn  Follow-up blood cultures  Follow-up wound culture  Dental surgery follow-up  Supportive care  Monitor temperature and WBC  Patient can be discharged on Augmentin for total of 2 weeks.  Plan discussed with patient/dental surgeon     This is a complex infectious disease issue and the following was performed today (for more details please see the above note): Management decisions reflecting the added complexity (e.g., changes in antimicrobial therapy, infection control strategies).        Marco Escobar MD

## 2025-06-24 NOTE — PROGRESS NOTES
"Adam Contreras is a 64 y.o. male on day 4 of admission presenting with Submental abscess.    Subjective   He underwent incision and drainage of the submental abscess on 6/21.  He has no acute complaints.  He is on IV Unasyn and vancomycin.  He wants to go home.    Objective     Physical Exam  General: awake, alert, oriented, responsive  Neck: there was a wound in the submental region packed with a dressing, no active drainage or bleeding.   Cardiovascular: regular, normal S1 and S2  Lungs: good air entry bilaterally, clear to auscultation  Extremities: no peripheral cyanosis, no pedal edema  Neuro: alert, oriented x 3, no focal weakness      Last Recorded Vitals  Blood pressure 132/72, pulse 69, temperature 36.4 °C (97.5 °F), temperature source Oral, resp. rate 18, height 1.854 m (6' 1\"), weight 102 kg (225 lb), SpO2 99%.  Intake/Output last 3 Shifts:  I/O last 3 completed shifts:  In: 1140 (11.2 mL/kg) [P.O.:240; IV Piggyback:900]  Out: - (0 mL/kg)   Weight: 102.1 kg     Relevant Results  Lab Results   Component Value Date    WBC 8.2 06/24/2025    HGB 14.2 06/24/2025    HCT 42.6 06/24/2025    MCV 82 06/24/2025     06/24/2025     Lab Results   Component Value Date    GLUCOSE 213 (H) 06/24/2025    CALCIUM 9.2 06/24/2025     06/24/2025    K 4.5 06/24/2025    CO2 31 06/24/2025    CL 99 06/24/2025    BUN 10 06/24/2025    CREATININE 0.68 06/24/2025     Scheduled medications  Scheduled Medications[1]  Continuous medications  Continuous Medications[2]  PRN medications  PRN Medications[3]      Assessment & Plan    Submental abscess  ID consulted based on abundant gram-positiv  e and gram-negative's from wound tissue final results pending.    Continue IV Unasyn and vancomycin    Leukocytosis  In the setting of submental abscess  Resolved    Hyperglycemia  His blood glucose has been elevated in the past.  Hemoglobin A1c was 14.7% on 6/22 indicative of type 2 diabetes mellitus.    Hypertension  Continue " lisinopril    Plan  Discharge home on Augmentin to complete a total of 2 weeks of augmentin.   Follow-up with Dr. Jarvis Kwong in the office. With his markedly elevated hemoglobin A1c, all blood glucose levels are less than 300.  I discussed discharging on oral hypoglycemics, starting metformin.  He is reluctant to take it but he agrees. He is asked to follow-up with his new primary physician in 1 week.  Blood glucose test strips and a glucose meter will be prescribed.      Elie Escobar MD         [1] ampicillin-sulbactam, 3 g, intravenous, q6h  enoxaparin, 40 mg, subcutaneous, Daily  insulin lispro, 0-10 Units, subcutaneous, TID AC  lisinopril, 10 mg, oral, Daily  pantoprazole, 40 mg, intravenous, Daily  vancomycin, 1,250 mg, intravenous, q12h     [2]    [3] PRN medications: benzocaine-menthol, dextrose, dextrose, glucagon, glucagon, hydrALAZINE, HYDROmorphone, HYDROmorphone, ipratropium-albuteroL, morphine, ondansetron **OR** ondansetron, phenoL, vancomycin

## 2025-06-24 NOTE — CARE PLAN
Problem: Pain - Adult  Goal: Verbalizes/displays adequate comfort level or baseline comfort level  Outcome: Progressing     Problem: Safety - Adult  Goal: Free from fall injury  Outcome: Progressing     Problem: Discharge Planning  Goal: Discharge to home or other facility with appropriate resources  Outcome: Progressing     Problem: Chronic Conditions and Co-morbidities  Goal: Patient's chronic conditions and co-morbidity symptoms are monitored and maintained or improved  Outcome: Progressing     Problem: Nutrition  Goal: Nutrient intake appropriate for maintaining nutritional needs  Outcome: Progressing     Problem: Skin  Goal: Participates in plan/prevention/treatment measures  Outcome: Progressing  Goal: Promote skin healing  Outcome: Progressing   The patient's goals for the shift include Swelling will be decreased in size    The clinical goals for the shift include Maintain stable airway during shift

## 2025-06-24 NOTE — DISCHARGE SUMMARY
Discharge Diagnosis  Submental abscess   Diabetes mellitus type 2 with hyperglycemia  Hypertension    Issues Requiring Follow-Up  Outpatient follow-up with oral/maxillofacial surgeon.    Discharge Meds     Medication List      START taking these medications     blood sugar diagnostic; 1 each 2 times a day.   blood-glucose meter misc; Use daily or as directed for monitoring of   diabetes.   lancets misc; Use twice daily or as directed   metFORMIN 500 mg tablet; Commonly known as: Glucophage; Take 1 tablet   (500 mg) by mouth 2 times daily (morning and late afternoon).     CONTINUE taking these medications     Aleve 220 mg tablet; Generic drug: naproxen sodium   amoxicillin-clavulanate 875-125 mg tablet; Commonly known as: Augmentin;   Take 1 tablet by mouth 2 times a day.   lisinopril 20 mg tablet       Test Results Pending At Discharge  Pending Labs       Order Current Status    Blood Culture Preliminary result    Fungal Culture/Smear Preliminary result    Tissue/Wound Culture/Smear Preliminary result            Hospital Course   64-year-old male patient presented to the hospital from home with neck swelling and associated difficulty swallowing and clearing saliva at home.  A CT scan of the soft tissue of the neck showed enlarged submental abscess measuring up to 4.6 cm.  He was started on intravenous antibiotics. He underwent incision and drainage of the abscess on 6/21.  A culture was positive for mixed gram-positive and gram-negative bacteria.  Pain and swelling improved.  He was noted to be hyperglycemic in the hospital.  Hemoglobin A1c was 14.7%, however blood glucose levels were mostly in the 200s.  He has not been on any medication for elevated blood glucose in the past.  He was discharged on Augmentin, to complete a total of 14 days of antibiotics.  He was also started on metformin at discharge and asked to follow-up with his new primary physician in 1 week regarding management of diabetes.  Prescriptions  were provided for a blood glucose meter, test strips and lancets.      Outpatient Follow-Up  No future appointments.      Elie Escobar MD   alert and oriented x 3

## 2025-06-24 NOTE — PROGRESS NOTES
Oral and Maxillofacial Surgery Progress Note    Patient s/p I&D of anterior neck abscess subsequent to infected lower right tooth.  He is feeling better however reporting residual limited mouth opening and trouble swallowing.  No issues with breathing.      Vitals:    06/24/25 0752   BP: 132/72   Pulse: 69   Resp: 18   Temp: 36.4 °C (97.5 °F)   SpO2: 99%     Lab Results   Component Value Date    WBC 8.2 06/24/2025    HGB 14.2 06/24/2025    HCT 42.6 06/24/2025    MCV 82 06/24/2025     06/24/2025     Lab Results   Component Value Date    GLUCOSE 213 (H) 06/24/2025    CALCIUM 9.2 06/24/2025     06/24/2025    K 4.5 06/24/2025    CO2 31 06/24/2025    CL 99 06/24/2025    BUN 10 06/24/2025    CREATININE 0.68 06/24/2025     Exam:   Neck drain in place, suture intact with serosanginous fluid.  No purulent discharge at this time  Floor of mouth soft  Some limited mouth opening    A/P:  IV antibiotics per ID - oral antibiotics after discharge for 10 days  Advance diet as tolerated  OK to shower, do not submerge neck wound in water  Follow up as outpatient for drain removal    Jarvis wKong, DMD

## 2025-06-24 NOTE — CARE PLAN
The patient's goals for the shift include Swelling will be decreased in size    The clinical goals for the shift include Maintain stable airway during shift    Problem: Pain - Adult  Goal: Verbalizes/displays adequate comfort level or baseline comfort level  Outcome: Progressing     Problem: Safety - Adult  Goal: Free from fall injury  Outcome: Progressing     Problem: Nutrition  Goal: Nutrient intake appropriate for maintaining nutritional needs  Outcome: Progressing     Problem: Skin  Goal: Participates in plan/prevention/treatment measures  Outcome: Progressing  Flowsheets (Taken 6/24/2025 9045)  Participates in plan/prevention/treatment measures: Increase activity/out of bed for meals     Problem: Skin  Goal: Promote skin healing  Outcome: Progressing

## 2025-06-25 LAB — BACTERIA BLD CULT: NORMAL

## 2025-06-26 LAB
FUNGUS SPEC CULT: NORMAL
FUNGUS SPEC FUNGUS STN: NORMAL

## 2025-07-06 LAB
FUNGUS SPEC CULT: NORMAL
FUNGUS SPEC FUNGUS STN: NORMAL

## 2025-07-13 ENCOUNTER — HOSPITAL ENCOUNTER (EMERGENCY)
Facility: HOSPITAL | Age: 65
Discharge: HOME | End: 2025-07-13
Attending: EMERGENCY MEDICINE
Payer: COMMERCIAL

## 2025-07-13 VITALS
OXYGEN SATURATION: 99 % | TEMPERATURE: 97.7 F | WEIGHT: 240 LBS | BODY MASS INDEX: 31.81 KG/M2 | HEIGHT: 73 IN | DIASTOLIC BLOOD PRESSURE: 78 MMHG | RESPIRATION RATE: 18 BRPM | HEART RATE: 87 BPM | SYSTOLIC BLOOD PRESSURE: 138 MMHG

## 2025-07-13 DIAGNOSIS — S81.801A WOUND OF RIGHT LOWER EXTREMITY, INITIAL ENCOUNTER: Primary | ICD-10-CM

## 2025-07-13 LAB
ERYTHROCYTE [DISTWIDTH] IN BLOOD BY AUTOMATED COUNT: 13.2 % (ref 11.5–14.5)
HCT VFR BLD AUTO: 35.8 % (ref 41–52)
HGB BLD-MCNC: 11.8 G/DL (ref 13.5–17.5)
MCH RBC QN AUTO: 27.8 PG (ref 26–34)
MCHC RBC AUTO-ENTMCNC: 33 G/DL (ref 32–36)
MCV RBC AUTO: 84 FL (ref 80–100)
NRBC BLD-RTO: 0 /100 WBCS (ref 0–0)
PLATELET # BLD AUTO: 233 X10*3/UL (ref 150–450)
RBC # BLD AUTO: 4.25 X10*6/UL (ref 4.5–5.9)
WBC # BLD AUTO: 7.2 X10*3/UL (ref 4.4–11.3)

## 2025-07-13 PROCEDURE — 85027 COMPLETE CBC AUTOMATED: CPT | Performed by: EMERGENCY MEDICINE

## 2025-07-13 PROCEDURE — 2500000005 HC RX 250 GENERAL PHARMACY W/O HCPCS

## 2025-07-13 PROCEDURE — 36415 COLL VENOUS BLD VENIPUNCTURE: CPT | Performed by: EMERGENCY MEDICINE

## 2025-07-13 PROCEDURE — 99283 EMERGENCY DEPT VISIT LOW MDM: CPT | Performed by: EMERGENCY MEDICINE

## 2025-07-13 RX ADMIN — Medication 1 EACH: at 06:12

## 2025-07-13 RX ADMIN — Medication 1 EACH: at 06:11

## 2025-07-13 ASSESSMENT — PAIN SCALES - GENERAL: PAINLEVEL_OUTOF10: 0 - NO PAIN

## 2025-07-13 ASSESSMENT — PAIN - FUNCTIONAL ASSESSMENT: PAIN_FUNCTIONAL_ASSESSMENT: 0-10

## 2025-07-13 NOTE — ED TRIAGE NOTES
Patient presents to the ED with c/o a right leg small area on the front of the lower leg that started bleeding and would not stop.

## 2025-07-13 NOTE — ED PROVIDER NOTES
HPI   Chief Complaint   Patient presents with    Leg Injury     Right       HPI  Patient is a 64-year-old male presenting to the ED today for a bleeding wound to his right leg.  Patient explains that he has had a wound to his right anterior shin that scabbed over approximately 1 week ago.  This morning as he was getting ready for work, he accidentally scratched the scab off.  After scratching the scab off, he states that the wound started bleeding profusely.  He was unable to control the bleeding at home.  He used some dressings to wrap it up and came to the ED for further evaluation.  He is not on any anticoagulation.  He denies any chest pain, shortness of breath, dizziness, lightheadedness.  He has no additional concerns.      Patient History   Medical History[1]  Surgical History[2]  Family History[3]  Social History[4]    Physical Exam   ED Triage Vitals [07/13/25 0505]   Temperature Heart Rate Respirations BP   36.5 °C (97.7 °F) 87 18 138/78      Pulse Ox Temp src Heart Rate Source Patient Position   99 % -- -- --      BP Location FiO2 (%)     -- --       Physical Exam  Vitals and nursing note reviewed.   Constitutional:       General: He is not in acute distress.     Appearance: He is not toxic-appearing.   HENT:      Head: Normocephalic.      Mouth/Throat:      Mouth: Mucous membranes are moist.   Eyes:      Conjunctiva/sclera: Conjunctivae normal.   Cardiovascular:      Rate and Rhythm: Normal rate and regular rhythm.      Pulses: Normal pulses.   Pulmonary:      Effort: Pulmonary effort is normal. No respiratory distress.      Breath sounds: Normal breath sounds. No wheezing.   Musculoskeletal:         General: No swelling.      Cervical back: Neck supple.   Skin:     General: Skin is warm and dry.      Capillary Refill: Capillary refill takes less than 2 seconds.      Comments: Small, 0.25 centimeter wound present over the right anterior shin, lying directly over a varicose vein.  Not currently actively  bleeding at this time.  Normal capillary refill distally.  2+ DP pulses present.   Neurological:      General: No focal deficit present.      Mental Status: He is alert. Mental status is at baseline.           ED Course & MDM   Diagnoses as of 07/13/25 0629   Wound of right lower extremity, initial encounter             No data recorded     Pia Coma Scale Score: 15 (07/13/25 0522 : Lisa Lozano RN)                         Medical Decision Making  Patient was seen and evaluated for a wound to his right anterior shin.  Patient wrapped the wound at home, pressure dressing was applied, and patient presented to the ED.  Upon removing the pressure dressing, patient's wound is no longer bleeding.  As such, wound was rewrapped and plan was to discharge patient home.  Upon attempting to discharge the patient however, he walked out to the waiting room and again started bleeding.  At this point, we brought the patient back to his room and had him lie down.  Upon removing the dressings, wound is again no longer bleeding.  Surgifoam dressing is applied, and wound is redressed.  At this time, we had the patient ambulate back-and-forth across the carr.  Bleeding remains controlled and wound remains hemostatic.  However, patient notes that he is now feeling dizzy.  Therefore, CBC is ordered for further evaluation.  Hemoglobin is normal at 11.8.  Patient remains hemodynamically stable, not tachycardic or hypotensive.  Patient's wound remains hemostatic, not bleeding.  Patient is discharged home at this time in stable condition.      Procedure  Procedures       [1] No past medical history on file.  [2] No past surgical history on file.  [3] No family history on file.  [4]   Social History  Tobacco Use    Smoking status: Never     Passive exposure: Never    Smokeless tobacco: Never   Vaping Use    Vaping status: Never Used   Substance Use Topics    Alcohol use: Yes     Comment: barely per pt    Drug use: Never     Types: Other         Brittani CASIANO MD  07/13/25 0642

## 2025-08-03 ENCOUNTER — HOSPITAL ENCOUNTER (EMERGENCY)
Facility: HOSPITAL | Age: 65
Discharge: HOME | End: 2025-08-03
Payer: COMMERCIAL

## 2025-08-03 VITALS
HEIGHT: 73 IN | SYSTOLIC BLOOD PRESSURE: 125 MMHG | OXYGEN SATURATION: 100 % | BODY MASS INDEX: 33.13 KG/M2 | DIASTOLIC BLOOD PRESSURE: 80 MMHG | TEMPERATURE: 97.1 F | RESPIRATION RATE: 16 BRPM | HEART RATE: 93 BPM | WEIGHT: 250 LBS

## 2025-08-03 DIAGNOSIS — I83.899 BLEEDING FROM VARICOSE VEIN: Primary | ICD-10-CM

## 2025-08-03 PROCEDURE — 99281 EMR DPT VST MAYX REQ PHY/QHP: CPT

## 2025-08-03 PROCEDURE — 2500000005 HC RX 250 GENERAL PHARMACY W/O HCPCS

## 2025-08-03 PROCEDURE — 99283 EMERGENCY DEPT VISIT LOW MDM: CPT

## 2025-08-03 RX ADMIN — Medication 1 EACH: at 18:49

## 2025-08-03 ASSESSMENT — PAIN SCALES - GENERAL
PAINLEVEL_OUTOF10: 0 - NO PAIN
PAINLEVEL_OUTOF10: 0 - NO PAIN

## 2025-08-03 ASSESSMENT — PAIN - FUNCTIONAL ASSESSMENT: PAIN_FUNCTIONAL_ASSESSMENT: 0-10

## 2025-08-03 NOTE — ED PROVIDER NOTES
HPI   Chief Complaint   Patient presents with    Laceration     Patient reports he knocked off a scab on his right lower leg and it started bleeding, this happened 3 weeks ago. Today he was standing and felt blood running down his leg.        Is a 64-year-old male coming in for bleeding to the right anterior lower leg.  Patient reports that he was here 2 weeks ago and had an episode of bleeding from varicose vein.  He is unsure of what caused it.  He had it wrapped and dressed and did not take it off.  Today he was getting ready to go out of town and was packing when he noticed that he was bleeding from the same area.  He tried putting pressure to the area and coming in to have this evaluated.  He is not on anticoagulation medication.  No injury to the area.      History provided by:  Patient          Patient History   Medical History[1]  Surgical History[2]  Family History[3]  Social History[4]    Physical Exam   ED Triage Vitals [08/03/25 1820]   Temperature Heart Rate Respirations BP   36.2 °C (97.1 °F) 100 18 140/81      Pulse Ox Temp Source Heart Rate Source Patient Position   100 % Temporal Monitor --      BP Location FiO2 (%)     -- --       Physical Exam  Constitutional:       General: He is awake. He is not in acute distress.     Appearance: Normal appearance. He is not ill-appearing, toxic-appearing or diaphoretic.   HENT:      Head: Atraumatic.      Nose: No rhinorrhea.     Eyes:      General: No scleral icterus.        Right eye: No discharge.         Left eye: No discharge.     Pulmonary:      Effort: Pulmonary effort is normal. No respiratory distress.     Musculoskeletal:      Cervical back: Normal range of motion and neck supple.     Skin:     Comments: Patient has a circular area of bleeding to the right anterior mid shin.  Bleeding started after the area was cleaned to try to evaluate the area where the bleeding was coming from as his leg was covered in blood.  Controllable with direct pressure      Neurological:      Mental Status: He is alert.      GCS: GCS eye subscore is 4. GCS verbal subscore is 5. GCS motor subscore is 6.      Gait: Gait is intact.           ED Course & MDM   Diagnoses as of 08/03/25 1902   Bleeding from varicose vein                 No data recorded     Pia Coma Scale Score: 15 (08/03/25 1825 : Wendy Willett RN)                           Medical Decision Making  Summary:  Medical Decision Making:   Patient presented as described in HPI. Patient case including ROS, PE, and treatment and plan discussed with ED attending if attached as cosigner. Results from labs and or imaging included below if completed. Adam Contreras  is a 64 y.o. coming in for Patient presents with:  Laceration: Patient reports he knocked off a scab on his right lower leg and it started bleeding, this happened 3 weeks ago. Today he was standing and felt blood running down his leg.   .  Due to the patient's wound to the right lower extremity the areas evaluated.  Gelfoam was applied to the area as well as a pressure dressing.  He is advised to leave the area covered and the wound dressing on for 24 hours.  He not have any return of bleeding.  Will be discharged advised follow-up with his PCP.  Patient is asking about leaving the dressing on until next week when he gets back however I advised him he should not leave it on past 24 hours 72 hours of the most however if he does develop swelling of the leg he needs to take the dressing off.  Patient will be discharged at this time and advised to keep the area elevated.      Disposition is completed with shared decision making with the patient or guardian present with the patient. They were advised to follow up with PCP or recommended provider in 2-3 days for another evaluation and exam. I advised the patient to return or go to closest emergency room immediately if symptoms change, get worse, or new symptoms develop prior to follow up. I explained the plan  and treatment course. Patient/guardian is in agreement with plan, treatment course, and follow up and state that they will comply.    Labs Reviewed - No data to display   No orders to display                         Tests/Medications/Escalations of Care considered but not given: As in MDM    Patient care discussed with: N/A  Social Determinants affecting care: N/A    Final diagnosis and disposition as documented     Diagnoses as of 08/03/25 1906  Bleeding from varicose vein       Shared decision making was completed and determined that patient will be discharged. I discussed the differential; results and discharge plan with the patient and/or family/friend/caregiver if present.  I emphasized the importance of follow-up with the physician I referred them to in the timeframe recommended.  I explained reasons for the patient to return to the Emergency Department. They agreed that if they feel their condition is worsening or if they have any other concern they should call 911 immediately for further assistance. I gave the patient an opportunity to ask all questions they had and answered all of them accordingly. They understand return precautions and discharge instructions. The patient and/or family/friend/caregiver expressed understanding verbally and that they would comply.     Disposition: Discharge      This note has been transcribed using voice recognition and may contain grammatical errors, misplaced words, incorrect words, incorrect phrases or other errors.         Procedure  Procedures       [1] No past medical history on file.  [2] No past surgical history on file.  [3] No family history on file.  [4]   Social History  Tobacco Use    Smoking status: Never     Passive exposure: Never    Smokeless tobacco: Never   Vaping Use    Vaping status: Never Used   Substance Use Topics    Alcohol use: Yes     Comment: barely per pt    Drug use: Never     Types: Other        Mani Green PA-C  08/03/25 1907

## 2025-08-31 ENCOUNTER — HOSPITAL ENCOUNTER (EMERGENCY)
Facility: HOSPITAL | Age: 65
Discharge: HOME | End: 2025-08-31
Payer: COMMERCIAL

## 2025-08-31 VITALS
HEIGHT: 74 IN | WEIGHT: 250 LBS | SYSTOLIC BLOOD PRESSURE: 149 MMHG | OXYGEN SATURATION: 98 % | BODY MASS INDEX: 32.08 KG/M2 | DIASTOLIC BLOOD PRESSURE: 82 MMHG | HEART RATE: 71 BPM | TEMPERATURE: 98.2 F | RESPIRATION RATE: 17 BRPM

## 2025-08-31 DIAGNOSIS — S05.01XA ABRASION OF RIGHT CORNEA, INITIAL ENCOUNTER: Primary | ICD-10-CM

## 2025-08-31 PROCEDURE — 2500000001 HC RX 250 WO HCPCS SELF ADMINISTERED DRUGS (ALT 637 FOR MEDICARE OP): Performed by: PHYSICIAN ASSISTANT

## 2025-08-31 PROCEDURE — 65222 REMOVE FOREIGN BODY FROM EYE: CPT | Mod: RT | Performed by: PHYSICIAN ASSISTANT

## 2025-08-31 PROCEDURE — 2500000005 HC RX 250 GENERAL PHARMACY W/O HCPCS: Performed by: PHYSICIAN ASSISTANT

## 2025-08-31 PROCEDURE — 99283 EMERGENCY DEPT VISIT LOW MDM: CPT

## 2025-08-31 RX ORDER — CIPROFLOXACIN HYDROCHLORIDE 3 MG/ML
1 SOLUTION/ DROPS OPHTHALMIC ONCE
Status: COMPLETED | OUTPATIENT
Start: 2025-08-31 | End: 2025-08-31

## 2025-08-31 RX ORDER — TETRACAINE HYDROCHLORIDE 5 MG/ML
1 SOLUTION OPHTHALMIC ONCE
Status: COMPLETED | OUTPATIENT
Start: 2025-08-31 | End: 2025-08-31

## 2025-08-31 RX ORDER — FLUORESCEIN SODIUM 1 MG/MG
1 STRIP OPHTHALMIC ONCE
Status: COMPLETED | OUTPATIENT
Start: 2025-08-31 | End: 2025-08-31

## 2025-08-31 RX ORDER — CIPROFLOXACIN HYDROCHLORIDE 3 MG/ML
1 SOLUTION/ DROPS OPHTHALMIC
Qty: 3.5 ML | Refills: 0 | Status: SHIPPED | OUTPATIENT
Start: 2025-08-31 | End: 2025-09-05

## 2025-08-31 RX ADMIN — CIPROFLOXACIN HYDROCHLORIDE 1 DROP: 3 SOLUTION/ DROPS OPHTHALMIC at 20:32

## 2025-08-31 RX ADMIN — TETRACAINE HYDROCHLORIDE 1 DROP: 5 SOLUTION OPHTHALMIC at 19:26

## 2025-08-31 RX ADMIN — FLUORESCEIN SODIUM 1 STRIP: 1 STRIP OPHTHALMIC at 19:25

## 2025-08-31 RX ADMIN — WATER, PURIFIED: 99.05 LIQUID OPHTHALMIC at 20:26

## 2025-08-31 ASSESSMENT — PAIN DESCRIPTION - ORIENTATION: ORIENTATION: RIGHT

## 2025-08-31 ASSESSMENT — PAIN DESCRIPTION - FREQUENCY: FREQUENCY: CONSTANT/CONTINUOUS

## 2025-08-31 ASSESSMENT — PAIN SCALES - GENERAL
PAINLEVEL_OUTOF10: 5 - MODERATE PAIN
PAINLEVEL_OUTOF10: 1

## 2025-08-31 ASSESSMENT — PAIN DESCRIPTION - PAIN TYPE: TYPE: ACUTE PAIN

## 2025-08-31 ASSESSMENT — PAIN - FUNCTIONAL ASSESSMENT: PAIN_FUNCTIONAL_ASSESSMENT: 0-10

## 2025-08-31 ASSESSMENT — PAIN DESCRIPTION - LOCATION: LOCATION: EYE

## 2025-08-31 ASSESSMENT — PAIN DESCRIPTION - DESCRIPTORS: DESCRIPTORS: BURNING

## (undated) DEVICE — SOLUTION, IRRIGATION, X RX SODIUM CHL 0.9%, 1000ML BTL

## (undated) DEVICE — Device

## (undated) DEVICE — NEEDLE, HYPODERMIC, PROEDGE, 25G X 5/8, ORANGE

## (undated) DEVICE — COVER, MAYO STAND, W/PAD, 23 IN, DISPOSABLE, PLASTIC, LF, STERILE

## (undated) DEVICE — NEEDLE, ELECTRODE, ELECTROSURGICAL, INSULATED

## (undated) DEVICE — SPONGE, GAUZE, AVANT, STERILE, NONWOVEN, 4PLY, 4 X 4, STANDARD

## (undated) DEVICE — DRESSING, GAUZE, PETROLATUM, PATCH, XEROFORM, 1 X 8 IN, STERILE